# Patient Record
Sex: FEMALE | Race: WHITE | NOT HISPANIC OR LATINO | Employment: FULL TIME | URBAN - METROPOLITAN AREA
[De-identification: names, ages, dates, MRNs, and addresses within clinical notes are randomized per-mention and may not be internally consistent; named-entity substitution may affect disease eponyms.]

---

## 2017-05-31 ENCOUNTER — HOSPITAL ENCOUNTER (OUTPATIENT)
Dept: LAB | Facility: HOSPITAL | Age: 37
Discharge: HOME | End: 2017-05-31
Attending: FAMILY MEDICINE | Admitting: FAMILY MEDICINE

## 2017-05-31 LAB
ALBUMIN SERPL-MCNC: 4.3 G/DL (ref 3.5–4.8)
ALBUMIN/GLOB SERPL: 1.4 {RATIO} (ref 1–1.7)
ALP SERPL-CCNC: 49 IU/L (ref 32–91)
ALT SERPL-CCNC: 12 IU/L (ref 14–54)
ANION GAP SERPL CALC-SCNC: 13.5 MMOL/L (ref 10–20)
AST SERPL-CCNC: 14 IU/L (ref 15–41)
BILIRUB SERPL-MCNC: 0.6 MG/DL (ref 0.3–1.2)
BUN SERPL-MCNC: 11 MG/DL (ref 8–20)
BUN/CREAT SERPL: 15.7 (ref 5.4–26.2)
CALCIUM SERPL-MCNC: 9.1 MG/DL (ref 8.9–10.3)
CHLORIDE SERPL-SCNC: 101 MMOL/L (ref 101–111)
CHOLEST SERPL-MCNC: 205 MG/DL
CHOLEST/HDLC SERPL: 4.8 {RATIO}
CONV CO2: 27 MMOL/L (ref 22–32)
CONV LDL CHOLESTEROL DIRECT: 140 MG/DL (ref 0–100)
CONV TOTAL PROTEIN: 7.3 G/DL (ref 6.1–7.9)
CREAT UR-MCNC: 0.7 MG/DL (ref 0.4–1)
ERYTHROCYTE [DISTWIDTH] IN BLOOD BY AUTOMATED COUNT: 13.3 % (ref 11.5–14.5)
GLOBULIN UR ELPH-MCNC: 3 G/DL (ref 2.5–3.8)
GLUCOSE SERPL-MCNC: 100 MG/DL (ref 65–99)
HCT VFR BLD AUTO: 43.6 % (ref 35–49)
HDLC SERPL-MCNC: 43 MG/DL
HGB BLD-MCNC: 14.4 G/DL (ref 12–15)
LDLC/HDLC SERPL: 3.3 {RATIO}
LIPID INTERPRETATION: ABNORMAL
MCH RBC QN AUTO: 26.3 PG (ref 26–32)
MCHC RBC AUTO-ENTMCNC: 33 G/DL (ref 32–36)
MCV RBC AUTO: 79.7 FL (ref 80–94)
PLATELET # BLD AUTO: 256 10*3/UL (ref 150–450)
PMV BLD AUTO: 7.9 FL (ref 7.4–10.4)
POTASSIUM SERPL-SCNC: 3.5 MMOL/L (ref 3.6–5.1)
RBC # BLD AUTO: 5.47 10*6/UL (ref 4–5.4)
SODIUM SERPL-SCNC: 138 MMOL/L (ref 136–144)
TRIGL SERPL-MCNC: 123 MG/DL
TSH SERPL-ACNC: 0.78 UIU/ML (ref 0.34–5.6)
VLDLC SERPL CALC-MCNC: 22.6 MG/DL
WBC # BLD AUTO: 7.6 10*3/UL (ref 4.5–11.5)

## 2017-06-07 ENCOUNTER — HOSPITAL ENCOUNTER (OUTPATIENT)
Dept: FAMILY MEDICINE CLINIC | Facility: CLINIC | Age: 37
Setting detail: SPECIMEN
Discharge: HOME | End: 2017-06-07
Attending: FAMILY MEDICINE | Admitting: FAMILY MEDICINE

## 2017-06-07 LAB — POTASSIUM SERPL-SCNC: 3.8 MMOL/L (ref 3.6–5.1)

## 2017-08-15 ENCOUNTER — HOSPITAL ENCOUNTER (OUTPATIENT)
Dept: MAMMOGRAPHY | Facility: HOSPITAL | Age: 37
Discharge: HOME | End: 2017-08-15
Attending: OBSTETRICS & GYNECOLOGY | Admitting: OBSTETRICS & GYNECOLOGY

## 2019-09-14 ENCOUNTER — HOSPITAL ENCOUNTER (EMERGENCY)
Facility: HOSPITAL | Age: 39
Discharge: HOME | End: 2019-09-14
Attending: EMERGENCY MEDICINE | Admitting: EMERGENCY MEDICINE

## 2019-09-14 VITALS
SYSTOLIC BLOOD PRESSURE: 143 MMHG | HEART RATE: 73 BPM | TEMPERATURE: 98.3 F | HEIGHT: 65 IN | DIASTOLIC BLOOD PRESSURE: 99 MMHG | OXYGEN SATURATION: 99 % | BODY MASS INDEX: 27.6 KG/M2 | RESPIRATION RATE: 14 BRPM | WEIGHT: 165.65 LBS

## 2019-09-14 DIAGNOSIS — E87.6 HYPOKALEMIA: ICD-10-CM

## 2019-09-14 DIAGNOSIS — F41.9 ANXIETY: ICD-10-CM

## 2019-09-14 DIAGNOSIS — I10 HYPERTENSION, UNSPECIFIED TYPE: Primary | ICD-10-CM

## 2019-09-14 LAB
ANION GAP SERPL CALCULATED.3IONS-SCNC: 14.8 MMOL/L (ref 5–15)
BUN BLD-MCNC: 11 MG/DL (ref 8–20)
BUN/CREAT SERPL: 15.7 (ref 5.4–26.2)
CALCIUM SPEC-SCNC: 8.8 MG/DL (ref 8.9–10.3)
CHLORIDE SERPL-SCNC: 98 MMOL/L (ref 101–111)
CO2 SERPL-SCNC: 25 MMOL/L (ref 22–32)
CREAT BLD-MCNC: 0.7 MG/DL (ref 0.4–1)
GFR SERPL CREATININE-BSD FRML MDRD: 93 ML/MIN/1.73
GLUCOSE BLD-MCNC: 138 MG/DL (ref 65–99)
POTASSIUM BLD-SCNC: 2.8 MMOL/L (ref 3.6–5.1)
SODIUM BLD-SCNC: 135 MMOL/L (ref 136–144)

## 2019-09-14 PROCEDURE — 96374 THER/PROPH/DIAG INJ IV PUSH: CPT

## 2019-09-14 PROCEDURE — 80048 BASIC METABOLIC PNL TOTAL CA: CPT | Performed by: EMERGENCY MEDICINE

## 2019-09-14 PROCEDURE — 99283 EMERGENCY DEPT VISIT LOW MDM: CPT

## 2019-09-14 PROCEDURE — 93005 ELECTROCARDIOGRAM TRACING: CPT | Performed by: EMERGENCY MEDICINE

## 2019-09-14 RX ORDER — AMLODIPINE BESYLATE 5 MG/1
5 TABLET ORAL DAILY
Qty: 30 TABLET | Refills: 0 | Status: SHIPPED | OUTPATIENT
Start: 2019-09-14 | End: 2019-10-28

## 2019-09-14 RX ORDER — CALCIUM CARBONATE/VITAMIN D3 500-10/5ML
800 LIQUID (ML) ORAL DAILY
Qty: 60 EACH | Refills: 0 | Status: SHIPPED | OUTPATIENT
Start: 2019-09-14 | End: 2019-09-30 | Stop reason: SDUPTHER

## 2019-09-14 RX ORDER — POTASSIUM CHLORIDE 1500 MG/1
20 TABLET, FILM COATED, EXTENDED RELEASE ORAL DAILY
Qty: 7 TABLET | Refills: 0 | Status: SHIPPED | OUTPATIENT
Start: 2019-09-14 | End: 2019-09-30

## 2019-09-14 RX ORDER — SODIUM CHLORIDE 0.9 % (FLUSH) 0.9 %
10 SYRINGE (ML) INJECTION AS NEEDED
Status: DISCONTINUED | OUTPATIENT
Start: 2019-09-14 | End: 2019-09-14 | Stop reason: HOSPADM

## 2019-09-14 RX ORDER — LABETALOL HYDROCHLORIDE 5 MG/ML
10 INJECTION, SOLUTION INTRAVENOUS ONCE
Status: COMPLETED | OUTPATIENT
Start: 2019-09-14 | End: 2019-09-14

## 2019-09-14 RX ORDER — POTASSIUM CHLORIDE 20 MEQ/1
40 TABLET, EXTENDED RELEASE ORAL ONCE
Status: COMPLETED | OUTPATIENT
Start: 2019-09-14 | End: 2019-09-14

## 2019-09-14 RX ADMIN — POTASSIUM CHLORIDE 40 MEQ: 1500 TABLET, EXTENDED RELEASE ORAL at 16:57

## 2019-09-14 RX ADMIN — LABETALOL 20 MG/4 ML (5 MG/ML) INTRAVENOUS SYRINGE 10 MG: at 15:20

## 2019-09-30 ENCOUNTER — OFFICE VISIT (OUTPATIENT)
Dept: INTERNAL MEDICINE | Age: 39
End: 2019-09-30

## 2019-09-30 VITALS
OXYGEN SATURATION: 98 % | DIASTOLIC BLOOD PRESSURE: 98 MMHG | HEIGHT: 65 IN | HEART RATE: 102 BPM | BODY MASS INDEX: 27.66 KG/M2 | SYSTOLIC BLOOD PRESSURE: 148 MMHG | TEMPERATURE: 97.4 F | WEIGHT: 166 LBS

## 2019-09-30 DIAGNOSIS — F41.1 GENERALIZED ANXIETY DISORDER: ICD-10-CM

## 2019-09-30 DIAGNOSIS — E78.5 HYPERLIPIDEMIA, UNSPECIFIED HYPERLIPIDEMIA TYPE: ICD-10-CM

## 2019-09-30 DIAGNOSIS — Z09 HOSPITAL DISCHARGE FOLLOW-UP: ICD-10-CM

## 2019-09-30 DIAGNOSIS — I10 ESSENTIAL HYPERTENSION: Primary | ICD-10-CM

## 2019-09-30 DIAGNOSIS — E87.6 HYPOKALEMIA: ICD-10-CM

## 2019-09-30 PROBLEM — Z78.9 NEVER SMOKED ANY SUBSTANCE: Status: ACTIVE | Noted: 2017-05-25

## 2019-09-30 PROBLEM — F43.9 STRESS AT HOME: Status: ACTIVE | Noted: 2017-05-25

## 2019-09-30 PROBLEM — F32.A DEPRESSION: Status: ACTIVE | Noted: 2017-05-25

## 2019-09-30 PROCEDURE — 99203 OFFICE O/P NEW LOW 30 MIN: CPT | Performed by: NURSE PRACTITIONER

## 2019-09-30 RX ORDER — IBUPROFEN 200 MG
TABLET ORAL
COMMUNITY
Start: 2017-05-25

## 2019-09-30 RX ORDER — CHLORAL HYDRATE 500 MG
CAPSULE ORAL
COMMUNITY
Start: 2019-09-14

## 2019-09-30 RX ORDER — DIMENHYDRINATE 50 MG
TABLET ORAL
COMMUNITY
Start: 2019-09-20 | End: 2020-12-22

## 2019-09-30 RX ORDER — AVOBENZONE, HOMOSALATE, OCTISALATE, OCTOCRYLENE, OXYBENZONE 30; 130; 50; 70; 40 MG/ML; MG/ML; MG/ML; MG/ML; MG/ML
LOTION TOPICAL
COMMUNITY
Start: 2017-07-03

## 2019-09-30 RX ORDER — ASCORBIC ACID 1000 MG
TABLET ORAL
COMMUNITY
Start: 2017-05-25 | End: 2019-10-28 | Stop reason: SDUPTHER

## 2019-09-30 RX ORDER — VENLAFAXINE HYDROCHLORIDE 75 MG/1
CAPSULE, EXTENDED RELEASE ORAL
Refills: 1 | COMMUNITY
Start: 2019-09-13 | End: 2019-09-30

## 2019-09-30 RX ORDER — VITAMIN E 268 MG
CAPSULE ORAL
COMMUNITY
Start: 2019-09-14

## 2019-09-30 RX ORDER — PRENATAL WITH FERROUS FUM AND FOLIC ACID 3080; 920; 120; 400; 22; 1.84; 3; 20; 10; 1; 12; 200; 27; 25; 2 [IU]/1; [IU]/1; MG/1; [IU]/1; MG/1; MG/1; MG/1; MG/1; MG/1; MG/1; UG/1; MG/1; MG/1; MG/1; MG/1
TABLET ORAL
COMMUNITY
Start: 2017-05-25 | End: 2019-09-30

## 2019-09-30 RX ORDER — MAGNESIUM OXIDE 400 MG/1
2 TABLET ORAL DAILY
Refills: 0 | COMMUNITY
Start: 2019-09-14

## 2019-09-30 RX ORDER — MELATONIN 10 MG
CAPSULE ORAL
COMMUNITY
Start: 2017-05-25 | End: 2019-09-30 | Stop reason: SDUPTHER

## 2019-09-30 RX ORDER — HYDROCHLOROTHIAZIDE 12.5 MG/1
TABLET ORAL
COMMUNITY
Start: 2017-05-25 | End: 2019-10-28

## 2019-09-30 RX ORDER — NICOTINE POLACRILEX 2 MG
GUM BUCCAL
COMMUNITY
Start: 2019-09-14 | End: 2019-09-30 | Stop reason: SDUPTHER

## 2019-09-30 RX ORDER — LISINOPRIL 10 MG/1
10 TABLET ORAL DAILY
Qty: 30 TABLET | Refills: 3 | Status: SHIPPED | OUTPATIENT
Start: 2019-09-30 | End: 2020-01-15 | Stop reason: SDUPTHER

## 2019-09-30 RX ORDER — BIOTIN 10 MG
TABLET ORAL
COMMUNITY
Start: 2017-07-03 | End: 2019-10-28 | Stop reason: SDUPTHER

## 2019-09-30 NOTE — PROGRESS NOTES
"  Donna Simpson / 38 y.o. / female  Encounter Date: 09/30/2019    VITALS    Visit Vitals  /98   Pulse 102   Temp 97.4 °F (36.3 °C) (Temporal)   Ht 165.1 cm (65\")   Wt 75.3 kg (166 lb)   LMP 09/09/2019   SpO2 98%   BMI 27.62 kg/m²       BP Readings from Last 3 Encounters:   09/30/19 148/98   09/14/19 143/99     Wt Readings from Last 3 Encounters:   09/30/19 75.3 kg (166 lb)   09/14/19 75.1 kg (165 lb 10.5 oz)      Body mass index is 27.62 kg/m².    CC:  Main reason(s) for today's visit: Establish Care and Hypertension      HPI:   38-year-old female presents today for establishing care and hospital follow-up for hypertension and anxiety.  Below are the notes for hospital follow-up, and the course of treatment includes her follow-up on anxiety.    Date of emergency department encounter: 9/14/19  Emergency department: [facility]  Principle Dx: HTN  Secondary Dx: Anxiety, hypokalemia    History prior to ED visit: 38-year-old female presented with elevated blood pressure.  She had been seen in her gynecologist office today prior to the hospital, and was started on Effexor.  She did not like the way the Effexor made her feel, she reports that she felt \"like she was on drugs\", and she stopped taking it.  She states she has been under increased stress and anxiety for the past couple years since losing a child in 2015, and with personal complications with her ex-.  She is also single mother of a 5-year-old child.  She reports that her blood pressure has been elevated over the past 3 to 4 years, however has become increasingly so over the past few weeks, with her SBP >150 and DBP >90 consistently.  She took at home dose of hydrochlorothiazide, for the past few days, which was previously prescribed to her for elevated blood pressure few years ago, but she was not taking consistently.  She denied chest pain, shortness of breath, headache, visual disturbance, numbness, weakness, " paresthesias.    Evaluation/Treatment: She was given IV labetalol one-time dose in the hospital, with improvement in blood pressure numbers.  BMP was also ordered, and she was positive for hypokalemia.  She was discharged and placed on potassium and magnesium supplements for home.  She was started on Norvasc 5 mg daily and Zoloft 25 mg daily.  Discontinued Effexor.  He was advised to follow-up with her PCP, and given instructions for how to set up with PCP.      Course: Blood pressure remains markedly elevated today including diastolic greater than 90.  Heart rate is elevated, however she does express that she is somewhat nervous because she does not typically come to the doctor on a regular basis.  We reviewed her medications and her symptoms today in office.  She does report that she has had a few episodes of palpitations when she is at home, most likely related to anxiety.  Palpitations or arrhythmia was never captured when she was on the monitor at the hospital.  She does have elevated blood pressure readings when she takes her blood pressure at home or at her job.  Symptoms associated with elevated blood pressure include occasional headache, and palpitations.  She denies chest pain, visual disturbance, dizziness, lightheadedness, excessive fatigue, lower extremity swelling.  She is currently taking amlodipine 5 mg, and hydrochlorothiazide 12.5 mg once daily.  She does state that she has noticed improvement in her overall anxiety symptoms since starting Zoloft 25 mg approximately 1/2 weeks ago.  She denies any side effects of this medication.  She denies suicidal thoughts or homicidal thoughts, or self-harm.  She has finished her 7-day dose of potassium, but is continuing magnesium supplement at home.  BMP has not been rechecked.  She has not had labs for routine maintenance checked since approximately 2017.  She does have a history of elevated cholesterol as well, for which she does not take any  medication.      Anxiety MARIAH-7 9/30/2019   Feeling nervous, anxious or on edge 3   Not being able to stop or control worrying 3   Worrying too much about different things 3   Trouble Relaxing 3   Being so restless that it is hard to sit still 2   Becoming easily annoyed or irritable 2   Feeling afraid as if something awful might happen 2   MARIAH 7 Total Score 18   If you checked any problems, how difficult have these problems made it for you to do your work, take care of things at home, or get along with other people Somewhat difficult       PHQ-9 Depression Screening  Little interest or pleasure in doing things? 1   Feeling down, depressed, or hopeless? 1   Trouble falling or staying asleep, or sleeping too much? 1   Feeling tired or having little energy? 1   Poor appetite or overeating? 0   Feeling bad about yourself - or that you are a failure or have let yourself or your family down? 1   Trouble concentrating on things, such as reading the newspaper or watching television? 1   Moving or speaking so slowly that other people could have noticed? Or the opposite - being so fidgety or restless that you have been moving around a lot more than usual? 0   Thoughts that you would be better off dead, or of hurting yourself in some way? 0   PHQ-9 Total Score 6   If you checked off any problems, how difficult have these problems made it for you to do your work, take care of things at home, or get along with other people? Somewhat difficult         Patient Care Team:  Delia Rivero APRN as PCP - General (Nurse Practitioner)  ____________________________________________________________________    ASSESSMENT & PLAN:    1. Hospital discharge follow-up    2. Essential hypertension  -Advised patient to continue amlodipine 5 mg and HCTZ 12.5 mg once daily.  Continue with low-sodium diet, regular exercise and healthy heart diet.  Continue to work on stress management, and good sleep habits.  Check labs today.  Start lisinopril 10 mg  once daily, monitor for side effects or complications when starting medication.  Monitor blood pressure daily, and report readings at follow-up visit in 1 month.  - CBC & Differential  - lisinopril (PRINIVIL,ZESTRIL) 10 MG tablet; Take 1 tablet by mouth Daily.  Dispense: 30 tablet; Refill: 3    3. Hypokalemia  -Recheck CMP for hypokalemia, treated from hospital visit.  Advised patient to increase dietary sources of potassium.  - Comprehensive Metabolic Panel    4. Hyperlipidemia, unspecified hyperlipidemia type  -Recheck when fasting.  - Lipid Panel With / Chol / HDL Ratio    5. Generalized anxiety disorder  -Continue Zoloft 25 mg once daily.  Advised patient to monitor for increase or worsening mood symptoms, including SI/HI.  Recommended that patient reestablish with her previous therapist, whom she liked, and is not currently seeing.  She does have their number and intent to follow-up with them and schedule appointment, which I have reinforced the importance of.  We will follow-up on this at visit in 1 month.    Orders Placed This Encounter   Procedures   • Comprehensive Metabolic Panel   • Lipid Panel With / Chol / HDL Ratio   • CBC & Differential     New Medications Ordered This Visit   Medications   • lisinopril (PRINIVIL,ZESTRIL) 10 MG tablet     Sig: Take 1 tablet by mouth Daily.     Dispense:  30 tablet     Refill:  3       Summary/Discussion:  See orders and instructions above.     Return in about 4 weeks (around 10/28/2019) for Recheck HTN, anxiety.    Future Appointments   Date Time Provider Department Center   10/28/2019 11:00 AM Delia Rivero APRN [facility] None     ____________________________________________________________________    REVIEW OF SYSTEMS    As noted per HPI  Constitutional neg  Resp neg  CV neg       PHYSICAL EXAMINATION    Constitutional  No distress  Cardiovascular Rate  normal . Rhythm: regular . Heart sounds:  normal  Pulmonary/Chest  Effort normal. Breath sounds:   normal  Psychiatric  Alert. Judgment and thought content normal. Mood normal    REVIEWED DATA:    Labs:   No visits with results within 14 Day(s) from this visit.   Latest known visit with results is:   Admission on 09/14/2019, Discharged on 09/14/2019   Component Date Value Ref Range Status   • Glucose 09/14/2019 138* 65 - 99 mg/dL Final   • BUN 09/14/2019 11  8 - 20 mg/dL Final   • Creatinine 09/14/2019 0.70  0.40 - 1.00 mg/dL Final   • Sodium 09/14/2019 135* 136 - 144 mmol/L Final   • Potassium 09/14/2019 2.8* 3.6 - 5.1 mmol/L Final   • Chloride 09/14/2019 98* 101 - 111 mmol/L Final   • CO2 09/14/2019 25.0  22.0 - 32.0 mmol/L Final   • Calcium 09/14/2019 8.8* 8.9 - 10.3 mg/dL Final   • eGFR Non African Amer 09/14/2019 93  >60 mL/min/1.73 Final   • BUN/Creatinine Ratio 09/14/2019 15.7  5.4 - 26.2 Final   • Anion Gap 09/14/2019 14.8  5.0 - 15.0 mmol/L Final         Imaging:   No results found.       Medical Tests:         Summary of old records / correspondence / consultant report:   DC summary re: issues addressed on HPI    Request outside records:       ALLERGIES  Allergies   Allergen Reactions   • Penicillins Unknown (See Comments)     Unknown        MEDICATIONS  Current Outpatient Medications on File Prior to Visit   Medication Sig   • amLODIPine (NORVASC) 5 MG tablet Take 1 tablet by mouth Daily.   • B Complex Vitamins (VITAMIN B COMPLEX PO)    • B Complex-Biotin-FA (B COMPLEX 100 TR) tablet controlled-release B COMPLEX 100 TR CR-TABS   • Biotin 10 MG tablet BIOTIN 10 MG TABS   • Coenzyme Q10 (CO Q 10) 10 MG capsule CO Q 10 10 MG CAPS   • Collagen-Vitamin C (COLLAGEN PLUS VITAMIN C PO)    • Flaxseed, Linseed, (FLAX SEED OIL) 1000 MG capsule    • hepatitis A (HAVRIX) 1440 EL U/ML vaccine Inject  into the appropriate muscle as directed by prescriber.   • hydroCHLOROthiazide (HYDRODIURIL) 12.5 MG tablet    • ibuprofen (EQ IBUPROFEN) 200 MG tablet EQ IBUPROFEN 200 MG TABS   • magnesium oxide (MAG-OX) 400 MG tablet  Take 2 tablets by mouth Daily.   • Melatonin 5 MG capsule    • Omega-3 Fatty Acids (FISH OIL) 1000 MG capsule capsule    • sertraline (ZOLOFT) 50 MG tablet Take 0.5 tablets by mouth Daily.   • vitamin E (vitamin E) 400 UNIT capsule    • [DISCONTINUED] Biotin 1 MG capsule    • [DISCONTINUED] Magnesium Oxide 400 MG capsule Take 800 mg by mouth Daily.   • [DISCONTINUED] Melatonin ([employer] MELATONIN) 10 MG capsule CVS MELATONIN 10 MG CAPS   • [DISCONTINUED] Prenatal Vit-Fe Fumarate-FA (PRENATAL 27-1) 27-1 MG tablet tablet PRENATAL 27-1 MG TABS   • [DISCONTINUED] venlafaxine XR (EFFEXOR-XR) 75 MG 24 hr capsule TK ONE C PO D   • [DISCONTINUED] potassium chloride ER (K-TAB) 20 MEQ tablet controlled-release ER tablet Take 1 tablet by mouth Daily.     No current facility-administered medications on file prior to visit.        PFSH:     The following portions of the patient's history were reviewed and updated as appropriate: Allergies / Current Medications / Past Medical History / Surgical History / Social History / Family History    PROBLEM LIST   Patient Active Problem List   Diagnosis   • Depression   • Hypertension   • Hypokalemia   • Never smoked any substance   • Stress at home       PAST MEDICAL HISTORY  Past Medical History:   Diagnosis Date   • Hypertension        SURGICAL HISTORY  No past surgical history on file.    SOCIAL HISTORY  Social History     Socioeconomic History   • Marital status: Single     Spouse name: Not on file   • Number of children: Not on file   • Years of education: Not on file   • Highest education level: Not on file   Tobacco Use   • Smoking status: Never Smoker   • Smokeless tobacco: Never Used   Substance and Sexual Activity   • Alcohol use: No     Frequency: Never   • Drug use: No       FAMILY HISTORY  No family history on file.

## 2019-09-30 NOTE — PATIENT INSTRUCTIONS

## 2019-10-01 ENCOUNTER — APPOINTMENT (OUTPATIENT)
Dept: LAB | Facility: HOSPITAL | Age: 39
End: 2019-10-01

## 2019-10-01 LAB
ALBUMIN SERPL-MCNC: 4.4 G/DL (ref 3.5–5.2)
ALBUMIN/GLOB SERPL: 1.5 G/DL
ALP SERPL-CCNC: 66 U/L (ref 39–117)
ALT SERPL W P-5'-P-CCNC: 22 U/L (ref 1–33)
ANION GAP SERPL CALCULATED.3IONS-SCNC: 11.4 MMOL/L (ref 5–15)
AST SERPL-CCNC: 17 U/L (ref 1–32)
BASOPHILS # BLD AUTO: 0.03 10*3/MM3 (ref 0–0.2)
BASOPHILS NFR BLD AUTO: 0.3 % (ref 0–1.5)
BILIRUB SERPL-MCNC: 0.2 MG/DL (ref 0.2–1.2)
BUN BLD-MCNC: 14 MG/DL (ref 6–20)
BUN/CREAT SERPL: 21.2 (ref 7–25)
CALCIUM SPEC-SCNC: 8.5 MG/DL (ref 8.6–10.5)
CHLORIDE SERPL-SCNC: 99 MMOL/L (ref 98–107)
CHOLEST SERPL-MCNC: 185 MG/DL (ref 0–200)
CO2 SERPL-SCNC: 28.6 MMOL/L (ref 22–29)
CREAT BLD-MCNC: 0.66 MG/DL (ref 0.57–1)
DEPRECATED RDW RBC AUTO: 36.6 FL (ref 37–54)
EOSINOPHIL # BLD AUTO: 0.19 10*3/MM3 (ref 0–0.4)
EOSINOPHIL NFR BLD AUTO: 1.9 % (ref 0.3–6.2)
ERYTHROCYTE [DISTWIDTH] IN BLOOD BY AUTOMATED COUNT: 13.3 % (ref 12.3–15.4)
GFR SERPL CREATININE-BSD FRML MDRD: 99 ML/MIN/1.73
GLOBULIN UR ELPH-MCNC: 2.9 GM/DL
GLUCOSE BLD-MCNC: 104 MG/DL (ref 65–99)
HCT VFR BLD AUTO: 39.1 % (ref 34–46.6)
HDLC SERPL QL: 4.02
HDLC SERPL-MCNC: 46 MG/DL (ref 40–60)
HGB BLD-MCNC: 12.2 G/DL (ref 12–15.9)
IMM GRANULOCYTES # BLD AUTO: 0.02 10*3/MM3 (ref 0–0.05)
IMM GRANULOCYTES NFR BLD AUTO: 0.2 % (ref 0–0.5)
LDLC SERPL CALC-MCNC: 124 MG/DL (ref 0–100)
LYMPHOCYTES # BLD AUTO: 2.72 10*3/MM3 (ref 0.7–3.1)
LYMPHOCYTES NFR BLD AUTO: 27.9 % (ref 19.6–45.3)
MCH RBC QN AUTO: 23.9 PG (ref 26.6–33)
MCHC RBC AUTO-ENTMCNC: 31.2 G/DL (ref 31.5–35.7)
MCV RBC AUTO: 76.7 FL (ref 79–97)
MONOCYTES # BLD AUTO: 0.58 10*3/MM3 (ref 0.1–0.9)
MONOCYTES NFR BLD AUTO: 5.9 % (ref 5–12)
NEUTROPHILS # BLD AUTO: 6.22 10*3/MM3 (ref 1.7–7)
NEUTROPHILS NFR BLD AUTO: 63.8 % (ref 42.7–76)
NRBC BLD AUTO-RTO: 0 /100 WBC (ref 0–0.2)
PLATELET # BLD AUTO: 289 10*3/MM3 (ref 140–450)
PMV BLD AUTO: 9.3 FL (ref 6–12)
POTASSIUM BLD-SCNC: 3.5 MMOL/L (ref 3.5–5.2)
PROT SERPL-MCNC: 7.3 G/DL (ref 6–8.5)
RBC # BLD AUTO: 5.1 10*6/MM3 (ref 3.77–5.28)
SODIUM BLD-SCNC: 139 MMOL/L (ref 136–145)
TRIGL SERPL-MCNC: 73 MG/DL (ref 0–150)
VLDLC SERPL-MCNC: 14.6 MG/DL (ref 5–40)
WBC NRBC COR # BLD: 9.76 10*3/MM3 (ref 3.4–10.8)

## 2019-10-01 PROCEDURE — 80061 LIPID PANEL: CPT | Performed by: NURSE PRACTITIONER

## 2019-10-01 PROCEDURE — 85025 COMPLETE CBC W/AUTO DIFF WBC: CPT | Performed by: NURSE PRACTITIONER

## 2019-10-01 PROCEDURE — 36415 COLL VENOUS BLD VENIPUNCTURE: CPT | Performed by: NURSE PRACTITIONER

## 2019-10-01 PROCEDURE — 80053 COMPREHEN METABOLIC PANEL: CPT | Performed by: NURSE PRACTITIONER

## 2019-10-15 ENCOUNTER — TELEPHONE (OUTPATIENT)
Dept: INTERNAL MEDICINE | Age: 39
End: 2019-10-15

## 2019-10-15 DIAGNOSIS — F41.1 GENERALIZED ANXIETY DISORDER: ICD-10-CM

## 2019-10-15 DIAGNOSIS — F43.9 STRESS AT HOME: Primary | ICD-10-CM

## 2019-10-15 RX ORDER — SERTRALINE HYDROCHLORIDE 25 MG/1
25 TABLET, FILM COATED ORAL DAILY
Qty: 30 TABLET | Refills: 5 | Status: SHIPPED | OUTPATIENT
Start: 2019-10-15 | End: 2019-10-16 | Stop reason: SDUPTHER

## 2019-10-15 NOTE — TELEPHONE ENCOUNTER
Regarding: Prescription Question  Contact: [phone]  ----- Message from Jay, Generic sent at 10/15/2019 12:56 PM EDT -----    Kevin Lin!    I have a few Sertraline left and wanted to know if you could refill my Rx? It is for Sertraline 50 mg. My preferred Pharmacy is [facility] in [place], [place]   [address] [place] [address]  Please let me know if you have any questions! :)    Thank you!!

## 2019-10-16 DIAGNOSIS — F43.9 STRESS AT HOME: ICD-10-CM

## 2019-10-16 DIAGNOSIS — F41.1 GENERALIZED ANXIETY DISORDER: ICD-10-CM

## 2019-10-16 RX ORDER — SERTRALINE HYDROCHLORIDE 25 MG/1
25 TABLET, FILM COATED ORAL DAILY
Qty: 30 TABLET | Refills: 5 | Status: SHIPPED | OUTPATIENT
Start: 2019-10-16 | End: 2019-10-28

## 2019-10-25 NOTE — PROGRESS NOTES
[facility]  TIFFANIE Hines      Donna VEGA Simpson / 39 y.o. / female  10/28/2019      ASSESSMENT & PLAN:    Problem List Items Addressed This Visit        Cardiovascular and Mediastinum    Hypertension - Primary    Current Assessment & Plan     Significantly improved. Continue lisinopril 10 mg once daily, monitor BP values and send readings if SBP<100 or >135. Follow up 4 months.          Relevant Medications    lisinopril (PRINIVIL,ZESTRIL) 10 MG tablet       Other    Depression    Relevant Medications    sertraline (ZOLOFT) 25 MG tablet    Stress at home    Relevant Medications    sertraline (ZOLOFT) 25 MG tablet    Generalized anxiety disorder    Current Assessment & Plan     Stable on 50 mg sertraline daily. Planning to start therapy/counseling again with previous provider or through [facility]. Will follow up in 4 months.          Relevant Medications    sertraline (ZOLOFT) 25 MG tablet        No orders of the defined types were placed in this encounter.    New Medications Ordered This Visit   Medications   • sertraline (ZOLOFT) 25 MG tablet     Sig: Take 2 tablets by mouth Daily.     Dispense:  30 tablet     Refill:  5       Summary/Discussion:      Return in about 4 months (around 2/28/2020) for Annual physical with fasting labs 1 week prior.  ____________________________________________________________________    MEDICATIONS  Current Outpatient Medications   Medication Sig Dispense Refill   • B Complex Vitamins (VITAMIN B COMPLEX PO)      • B Complex-Biotin-FA (B COMPLEX 100 TR) tablet controlled-release B COMPLEX 100 TR CR-TABS     • Collagen-Vitamin C (COLLAGEN PLUS VITAMIN C PO)      • Flaxseed, Linseed, (FLAX SEED OIL) 1000 MG capsule      • ibuprofen (EQ IBUPROFEN) 200 MG tablet EQ IBUPROFEN 200 MG TABS     • lisinopril (PRINIVIL,ZESTRIL) 10 MG tablet Take 1 tablet by mouth Daily. 30 tablet 3   • magnesium oxide (MAG-OX) 400 MG tablet Take 2 tablets by mouth Daily.  0   • Melatonin 5 MG  "capsule      • Omega-3 Fatty Acids (FISH OIL) 1000 MG capsule capsule      • sertraline (ZOLOFT) 25 MG tablet Take 2 tablets by mouth Daily. 30 tablet 5   • vitamin E (vitamin E) 400 UNIT capsule        No current facility-administered medications for this visit.        VITALS    Visit Vitals  /82   Pulse 71   Temp 98 °F (36.7 °C) (Temporal)   Ht 165.1 cm (65\")   Wt 74.8 kg (165 lb)   LMP 10/01/2019   SpO2 99%   BMI 27.46 kg/m²       BP Readings from Last 3 Encounters:   10/28/19 138/82   09/30/19 148/98   09/14/19 143/99     Wt Readings from Last 3 Encounters:   10/28/19 74.8 kg (165 lb)   09/30/19 75.3 kg (166 lb)   09/14/19 75.1 kg (165 lb 10.5 oz)      Body mass index is 27.46 kg/m².    CC:  Main reason(s) for today's visit: Follow-up for hypertension and depression/anxiety    HPI:   Chronic essential hypertension:  Since prior visit: compliant with medication(s), checks blood pressure regularly, denies significant problems with medication(s), SBP < 120 and SBP < 110. Currently taking lisinopril (Prinivil). She is occasionally exercising, and is adherent to low sodium diet. BP readings at home are SBP<120, stable. She denies symptoms of chest pain/pressure, dyspnea, swelling, vision impairment.   CVD risk factors include:  HTN, minimally sedentary lifestyle. Use of agents associated with HTN: no tobacco use, alcohol intake:occasional and caffeine intake: 1-2 cups of caffeinated coffee per day . Most recent in-office blood pressure readings:   BP Readings from Last 3 Encounters:   10/28/19 138/82   09/30/19 148/98   09/14/19 143/99       Depression/anxiety: Stable today.  Patient has increased sertraline to 50 mg once daily.  Tolerating well.  She plans to contact her previous therapist, in order to reestablish, however if therapist is out of network she will explore new options.  She is to [facility] and is aware of [facility] services.  I advised her to let me know if she is needing my " assistance with establishing with a therapist or counselor.  We discussed the importance of therapy in combination with medication for best results with anxiety and depression.  She verbalized understanding and agreement.  She feels overall improved mood and ability to control intensity of mood swings.    Patient Care Team:  Delia Rivero APRN as PCP - General (Nurse Practitioner)  ____________________________________________________________________      REVIEW OF SYSTEMS    Review of Systems  As noted per HPI  Constitutional neg  Resp neg  CV neg       PHYSICAL EXAMINATION    Physical Exam  Constitutional  No distress  Cardiovascular Rate  normal . Rhythm: regular . Heart sounds:  Normal  Pulmonary/Chest: Effort normal and breath sounds normal.    Psychiatric  Alert. Judgment and thought content normal. Mood normal    REVIEWED DATA:    Labs:   Lab Results   Component Value Date     10/01/2019    K 3.5 10/01/2019    AST 17 10/01/2019    ALT 22 10/01/2019    BUN 14 10/01/2019    CREATININE 0.66 10/01/2019    CREATININE 0.70 09/14/2019    CREATININE 0.7 05/31/2017    EGFRIFNONA 99 10/01/2019       Lab Results   Component Value Date    GLUCOSE 104 (H) 10/01/2019    GLUCOSE 138 (H) 09/14/2019    GLUCOSE 100 (H) 05/31/2017       Lab Results   Component Value Date     (H) 10/01/2019     (H) 05/31/2017    HDL 46 10/01/2019    HDL 43 05/31/2017    TRIG 73 10/01/2019    TRIG 123 05/31/2017    CHOLHDLRATIO 4.02 10/01/2019    CHOLHDLRATIO 4.8 05/31/2017       Lab Results   Component Value Date    TSH 0.78 05/31/2017          Lab Results   Component Value Date    WBC 9.76 10/01/2019    HGB 12.2 10/01/2019    HGB 14.4 05/31/2017     10/01/2019       No results found for: PROTEIN, GLUCOSEU, BLOODU, NITRITEU, LEUKOCYTESUR    Imaging:        Medical Tests:        Summary of old records / correspondence / consultant report:        Request outside records:        ALLERGIES  Allergies   Allergen Reactions    • Penicillins Unknown (See Comments)     Unknown        PFSH:     The following portions of the patient's history were reviewed and updated as appropriate: Allergies / Current Medications / Past Medical History / Surgical History / Social History / Family History    PROBLEM LIST   Patient Active Problem List   Diagnosis   • Depression   • Hypertension   • Hypokalemia   • Never smoked any substance   • Stress at home   • Generalized anxiety disorder       PAST MEDICAL HISTORY  Past Medical History:   Diagnosis Date   • Hypertension        SURGICAL HISTORY  No past surgical history on file.    SOCIAL HISTORY  Social History     Socioeconomic History   • Marital status: Single     Spouse name: Not on file   • Number of children: Not on file   • Years of education: Not on file   • Highest education level: Not on file   Tobacco Use   • Smoking status: Never Smoker   • Smokeless tobacco: Never Used   Substance and Sexual Activity   • Alcohol use: No     Frequency: Never   • Drug use: No       FAMILY HISTORY  No family history on file.

## 2019-10-28 ENCOUNTER — OFFICE VISIT (OUTPATIENT)
Dept: INTERNAL MEDICINE | Age: 39
End: 2019-10-28

## 2019-10-28 VITALS
OXYGEN SATURATION: 99 % | HEART RATE: 71 BPM | DIASTOLIC BLOOD PRESSURE: 82 MMHG | BODY MASS INDEX: 27.49 KG/M2 | TEMPERATURE: 98 F | WEIGHT: 165 LBS | HEIGHT: 65 IN | SYSTOLIC BLOOD PRESSURE: 138 MMHG

## 2019-10-28 DIAGNOSIS — I10 ESSENTIAL HYPERTENSION: Primary | ICD-10-CM

## 2019-10-28 DIAGNOSIS — F41.1 GENERALIZED ANXIETY DISORDER: ICD-10-CM

## 2019-10-28 DIAGNOSIS — F43.9 STRESS AT HOME: ICD-10-CM

## 2019-10-28 DIAGNOSIS — F32.A DEPRESSION, UNSPECIFIED DEPRESSION TYPE: ICD-10-CM

## 2019-10-28 PROCEDURE — 99214 OFFICE O/P EST MOD 30 MIN: CPT | Performed by: NURSE PRACTITIONER

## 2019-10-28 RX ORDER — SERTRALINE HYDROCHLORIDE 25 MG/1
50 TABLET, FILM COATED ORAL DAILY
Qty: 30 TABLET | Refills: 5
Start: 2019-10-28 | End: 2019-10-30 | Stop reason: SDUPTHER

## 2019-10-28 NOTE — ASSESSMENT & PLAN NOTE
Significantly improved. Continue lisinopril 10 mg once daily, monitor BP values and send readings if SBP<100 or >135. Follow up 4 months.

## 2019-10-28 NOTE — ASSESSMENT & PLAN NOTE
Stable on 50 mg sertraline daily. Planning to start therapy/counseling again with previous provider or through [facility]. Will follow up in 4 months.

## 2019-10-30 DIAGNOSIS — F43.9 STRESS AT HOME: ICD-10-CM

## 2019-10-30 DIAGNOSIS — F41.1 GENERALIZED ANXIETY DISORDER: ICD-10-CM

## 2020-01-07 ENCOUNTER — OFFICE VISIT (OUTPATIENT)
Dept: INTERNAL MEDICINE | Age: 40
End: 2020-01-07

## 2020-01-07 VITALS
DIASTOLIC BLOOD PRESSURE: 62 MMHG | SYSTOLIC BLOOD PRESSURE: 104 MMHG | OXYGEN SATURATION: 98 % | HEIGHT: 65 IN | HEART RATE: 103 BPM | BODY MASS INDEX: 26.49 KG/M2 | WEIGHT: 159 LBS | TEMPERATURE: 96.7 F

## 2020-01-07 DIAGNOSIS — N76.6 GENITAL ULCER, FEMALE: Primary | ICD-10-CM

## 2020-01-07 DIAGNOSIS — J02.9 PHARYNGITIS, UNSPECIFIED ETIOLOGY: ICD-10-CM

## 2020-01-07 DIAGNOSIS — K12.0 APHTHOUS ULCER OF MOUTH: ICD-10-CM

## 2020-01-07 PROCEDURE — 99214 OFFICE O/P EST MOD 30 MIN: CPT | Performed by: NURSE PRACTITIONER

## 2020-01-07 RX ORDER — VALACYCLOVIR HYDROCHLORIDE 1 G/1
1000 TABLET, FILM COATED ORAL 2 TIMES DAILY
Qty: 20 TABLET | Refills: 0 | Status: SHIPPED | OUTPATIENT
Start: 2020-01-07 | End: 2020-01-17

## 2020-01-07 NOTE — PROGRESS NOTES
Donna Simpson / 39 y.o. / female  Encounter Date: 01/07/2020    ASSESSMENT & PLAN:    Problem List Items Addressed This Visit     None      Visit Diagnoses     Genital ulcer, female    -  Primary    Relevant Medications    valACYclovir (VALTREX) 1000 MG tablet    lidocaine (XYLOCAINE) 2 % jelly    Other Relevant Orders    Chlamydia trachomatis, Neisseria gonorrhoeae, Trichomonas vaginalis, PCR - Urine, Urine, Clean Catch    HIV-1 / O / 2 Ag / Antibody 4th Generation    RPR    HSV 1 & 2 - Specific Antibody, IgG    HSV 1 & 2 IgM, Antibodies, Indirect    Aphthous ulcer of mouth        Relevant Orders    Chlamydia trachomatis, Neisseria gonorrhoeae, Trichomonas vaginalis, PCR - Urine, Urine, Clean Catch    HIV-1 / O / 2 Ag / Antibody 4th Generation    RPR    HSV 1 & 2 - Specific Antibody, IgG    HSV 1 & 2 IgM, Antibodies, Indirect    Pharyngitis, unspecified etiology        Relevant Orders    Chlamydia trachomatis, Neisseria gonorrhoeae, Trichomonas vaginalis, PCR - Urine, Urine, Clean Catch    HIV-1 / O / 2 Ag / Antibody 4th Generation    RPR    HSV 1 & 2 - Specific Antibody, IgG    HSV 1 & 2 IgM, Antibodies, Indirect        Orders Placed This Encounter   Procedures   • Chlamydia trachomatis, Neisseria gonorrhoeae, Trichomonas vaginalis, PCR - Urine, Urine, Clean Catch   • HIV-1 / O / 2 Ag / Antibody 4th Generation   • RPR   • HSV 1 & 2 - Specific Antibody, IgG   • HSV 1 & 2 IgM, Antibodies, Indirect     New Medications Ordered This Visit   Medications   • valACYclovir (VALTREX) 1000 MG tablet     Sig: Take 1 tablet by mouth 2 (Two) Times a Day for 10 days.     Dispense:  20 tablet     Refill:  0   • lidocaine (XYLOCAINE) 2 % jelly     Sig: Apply  topically to the appropriate area as directed As Needed for Mild Pain  or Moderate Pain .     Dispense:  85 g     Refill:  1       Summary/Discussion:  1. Genital ulcer, female  -Full STI panel including urine, swab, blood tests for chlamydia, gonorrhea, trichomonas, HIV,  "syphilis, HSV.  We will preemptively treat with valacyclovir as prescribed, and prescription for lidocaine jelly to apply topically to the area for pain relief.  Advised patient to continue with cool compresses as needed, avoid irritating or tight clothing, use Magic mouthwash and oral rinses for ulcers in mouth and pharyngitis.  Monitor for new or worsening symptoms, and return to clinic as needed.    - Chlamydia trachomatis, Neisseria gonorrhoeae, Trichomonas vaginalis, PCR - Urine, Urine, Clean Catch  - HIV-1 / O / 2 Ag / Antibody 4th Generation  - RPR  - HSV 1 & 2 - Specific Antibody, IgG  - HSV 1 & 2 IgM, Antibodies, Indirect  - valACYclovir (VALTREX) 1000 MG tablet; Take 1 tablet by mouth 2 (Two) Times a Day for 10 days.  Dispense: 20 tablet; Refill: 0  - lidocaine (XYLOCAINE) 2 % jelly; Apply  topically to the appropriate area as directed As Needed for Mild Pain  or Moderate Pain .  Dispense: 85 g; Refill: 1    2. Aphthous ulcer of mouth  - Chlamydia trachomatis, Neisseria gonorrhoeae, Trichomonas vaginalis, PCR - Urine, Urine, Clean Catch  - HIV-1 / O / 2 Ag / Antibody 4th Generation  - RPR  - HSV 1 & 2 - Specific Antibody, IgG  - HSV 1 & 2 IgM, Antibodies, Indirect    3. Pharyngitis, unspecified etiology  - Chlamydia trachomatis, Neisseria gonorrhoeae, Trichomonas vaginalis, PCR - Urine, Urine, Clean Catch  - HIV-1 / O / 2 Ag / Antibody 4th Generation  - RPR  - HSV 1 & 2 - Specific Antibody, IgG  - HSV 1 & 2 IgM, Antibodies, Indirect        Return if symptoms worsen or fail to improve, for Next scheduled follow up.  ________________________________________________________________    VITALS:    Visit Vitals  /62   Pulse 103   Temp 96.7 °F (35.9 °C) (Temporal)   Ht 165.1 cm (65\")   Wt 72.1 kg (159 lb)   SpO2 98%   BMI 26.46 kg/m²       BP Readings from Last 3 Encounters:   01/07/20 104/62   10/28/19 138/82   09/30/19 148/98     Wt Readings from Last 3 Encounters:   01/07/20 72.1 kg (159 lb)   10/28/19 " 74.8 kg (165 lb)   09/30/19 75.3 kg (166 lb)      Body mass index is 26.46 kg/m².    CC: Main reason(s) for today's visit: Exposure to STD      HPI    Patient is a 39 y.o. female who is here for: vaginal ulcers, pharyngitis and 2-3 oral aphthous ulcers x 7 days. Please refer to [employer] message from patient dated 1/7/2020 for further details as summarized here: She first noticed the rash 2 days after new sexual contact on 1/1/2020, following 4 years of abstinence. Rash is described as: Initially sore and tender in the vaginal region, with development of sores/blisters yesterday and yellow, purulent discharge.  The sores are extremely painful, and weeping, she is having to wear a panty liner for drainage.  She has had a low-grade fever for approximately 4 days.  She also complains of pharyngitis, and ulcers in her mouth that have also developed within the past few days, as well as overall fatigue.  Genital ulcers are diffuse, scattered along MECHELLE labia both external and internally. Aggravated by: touch/underwear/clothing. Relieved by: cool wash. She has been applying triple antibiotic ointment and A+D ointment without relief.  Associated symptoms include: low grade fever, pharyngitis. She is afebrile on exam today. She does have 2 moderate size aphthous ulcers inside the lower L lip without bleeding.   She denies: oral intercourse or knowledge of partner's STI status.       Patient Care Team:  Delia Rivero APRN as PCP - General (Nurse Practitioner)  ____________________________________________________________________    REVIEW OF SYSTEMS    Review of Systems  As noted per HPI  Constitutional neg  Resp neg  CV neg     PHYSICAL EXAMINATION    Physical Exam   Constitutional: She is oriented to person, place, and time. She appears well-developed and well-nourished. She appears distressed.   HENT:   Mouth/Throat: Uvula is midline and mucous membranes are normal. Oral lesions (aphthous ulcers inside lower lip MECHELLE x 3 )  present. No uvula swelling or lacerations. Posterior oropharyngeal edema (MECHELLE tonsils 2+) and posterior oropharyngeal erythema present. No oropharyngeal exudate or tonsillar abscesses.   Neck: Normal range of motion. Neck supple.   Cardiovascular: Regular rhythm and normal heart sounds. Tachycardia present.   Mild tachycardia 103     Genitourinary:       There is tenderness and lesion (scattered lesions throughout labial folds, extended to anus. closed top ulcerations, mild erythema and edema. ) on the right labia.   Genitourinary Comments: Lesion as noted. Thick green/yellow discharge noted throughout labial folds.    Musculoskeletal: Normal range of motion.   Lymphadenopathy:     She has no cervical adenopathy.   Neurological: She is alert and oriented to person, place, and time.   Psychiatric: She has a normal mood and affect.   Vitals reviewed.        REVIEWED DATA:    Labs:   Lab Results   Component Value Date     10/01/2019    K 3.5 10/01/2019    AST 17 10/01/2019    ALT 22 10/01/2019    BUN 14 10/01/2019    CREATININE 0.66 10/01/2019    CREATININE 0.70 09/14/2019    CREATININE 0.7 05/31/2017    EGFRIFNONA 99 10/01/2019       Lab Results   Component Value Date    GLUCOSE 104 (H) 10/01/2019    GLUCOSE 138 (H) 09/14/2019    GLUCOSE 100 (H) 05/31/2017       Lab Results   Component Value Date     (H) 10/01/2019     (H) 05/31/2017    HDL 46 10/01/2019    TRIG 73 10/01/2019    CHOLHDLRATIO 4.02 10/01/2019       Lab Results   Component Value Date    TSH 0.78 05/31/2017          Lab Results   Component Value Date    WBC 9.76 10/01/2019    HGB 12.2 10/01/2019    HGB 14.4 05/31/2017     10/01/2019         Imaging:      Medical Tests:      Summary of old records / correspondence / consultant report:      Request outside records:   ______________________________________________________________________    ALLERGIES  Allergies   Allergen Reactions   • Penicillins Unknown (See Comments)     Unknown         MEDICATIONS  Current Outpatient Medications on File Prior to Visit   Medication Sig   • B Complex Vitamins (VITAMIN B COMPLEX PO)    • B Complex-Biotin-FA (B COMPLEX 100 TR) tablet controlled-release B COMPLEX 100 TR CR-TABS   • Collagen-Vitamin C (COLLAGEN PLUS VITAMIN C PO)    • Flaxseed, Linseed, (FLAX SEED OIL) 1000 MG capsule    • ibuprofen (EQ IBUPROFEN) 200 MG tablet EQ IBUPROFEN 200 MG TABS   • lisinopril (PRINIVIL,ZESTRIL) 10 MG tablet Take 1 tablet by mouth Daily.   • magnesium oxide (MAG-OX) 400 MG tablet Take 2 tablets by mouth Daily.   • Melatonin 5 MG capsule    • Omega-3 Fatty Acids (FISH OIL) 1000 MG capsule capsule    • sertraline (ZOLOFT) 50 MG tablet Take 1 tablet by mouth Daily.   • vitamin E (vitamin E) 400 UNIT capsule      No current facility-administered medications on file prior to visit.        PFSH:     The following portions of the patient's history were reviewed and updated as appropriate: Allergies / Current Medications / Past Medical History / Surgical History / Social History / Family History    PROBLEM LIST   Patient Active Problem List   Diagnosis   • Depression   • Hypertension   • Hypokalemia   • Never smoked any substance   • Stress at home   • Generalized anxiety disorder       PAST MEDICAL HISTORY  Past Medical History:   Diagnosis Date   • Hypertension        SURGICAL HISTORY  No past surgical history on file.    SOCIAL HISTORY  Social History     Socioeconomic History   • Marital status: Single     Spouse name: Not on file   • Number of children: Not on file   • Years of education: Not on file   • Highest education level: Not on file   Tobacco Use   • Smoking status: Never Smoker   • Smokeless tobacco: Never Used   Substance and Sexual Activity   • Alcohol use: No     Frequency: Never   • Drug use: No       FAMILY HISTORY  No family history on file.

## 2020-01-09 DIAGNOSIS — F41.1 GENERALIZED ANXIETY DISORDER: ICD-10-CM

## 2020-01-09 DIAGNOSIS — F43.9 STRESS AT HOME: ICD-10-CM

## 2020-01-10 ENCOUNTER — TELEPHONE (OUTPATIENT)
Dept: INTERNAL MEDICINE | Age: 40
End: 2020-01-10

## 2020-01-10 NOTE — TELEPHONE ENCOUNTER
----- Message from Donna Simpson sent at 1/9/2020  4:28 PM EST -----  Regarding: RE: Prescription Question  Contact: [phone]  Thank you! While I am very sad to see you go, I am excited for you and your next chapter! I will be on the lookout for ya! You've been absolutely awesome, Delia! :)    Donna Martin  ----- Message -----  From: TIFFANIE Hines  Sent: 1/9/2020  4:13 PM EST  To: Donna Simpson  Subject: RE: Prescription Question  Unfortunately, I'm not. My  was relocated for his job, and we will be gone for a year or two.   Keep an eye out for me, we will likely be back in a few years and I'll be wanting all my favorite patients back! Ha  Thank you for trusting me with your care. You'll be in great hands here with Anjali.   Feel better!      ----- Message -----     From: Donna Simpson     Sent: 1/9/2020 11:11 AM EST       To: TIFFANIE Hines  Subject: RE: Prescription Question    Thank you so much. :) I feel a little better today. I'm back at work, just laying low. lol   Will you be staying within the [facility]?    Donna Martin  ----- Message -----  From: TIFFANIE Hines  Sent: 1/9/2020 10:53 AM EST  To: Donna Simpson  Subject: RE: Prescription Question  Thanks for letting me know, I'll update the dose in your chart. I'm sorry, I know this time of year is really difficult.   I hope you're feeling somewhat better from our visit. I still haven't seen your labs come back yet, but we will let you know when they do.   Delia Martin      ----- Message -----     From: Donna Simpson     Sent: 1/9/2020  9:32 AM EST       To: TIFFANIE Hines  Subject: Prescription Question    Kevin Lin,    I meant to tell you that I started taking 1.5 tabs of sertraline (75 mg). I believe it was in early/mid November because my daughter's death anniversary is December 3rd and her due date was December 26. I was starting to feel like 50 wasn't quite enough. So far, 75 mg has been  working well.

## 2020-01-11 LAB
A VAGINAE DNA VAG QL NAA+PROBE: ABNORMAL SCORE
BVAB2 DNA VAG QL NAA+PROBE: ABNORMAL SCORE
C ALBICANS DNA VAG QL NAA+PROBE: POSITIVE
C GLABRATA DNA VAG QL NAA+PROBE: NEGATIVE
C TRACH RRNA SPEC QL NAA+PROBE: NEGATIVE
HIV 1+2 AB+HIV1 P24 AG SERPL QL IA: NON REACTIVE
HSV1 IGG SER IA-ACNC: <0.91 INDEX (ref 0–0.9)
HSV1 IGM TITR SER IF: NORMAL TITER
HSV2 IGG SER IA-ACNC: <0.91 INDEX (ref 0–0.9)
HSV2 IGM TITR SER IF: NORMAL TITER
MEGA1 DNA VAG QL NAA+PROBE: ABNORMAL SCORE
N GONORRHOEA RRNA SPEC QL NAA+PROBE: NEGATIVE
RPR SER QL: NON REACTIVE
T VAGINALIS DNA SPEC QL NAA+PROBE: NEGATIVE

## 2020-01-14 ENCOUNTER — OFFICE VISIT (OUTPATIENT)
Dept: INTERNAL MEDICINE | Age: 40
End: 2020-01-14

## 2020-01-14 ENCOUNTER — TELEPHONE (OUTPATIENT)
Dept: INTERNAL MEDICINE | Age: 40
End: 2020-01-14

## 2020-01-14 VITALS
DIASTOLIC BLOOD PRESSURE: 84 MMHG | TEMPERATURE: 97.1 F | HEART RATE: 76 BPM | SYSTOLIC BLOOD PRESSURE: 118 MMHG | OXYGEN SATURATION: 98 % | BODY MASS INDEX: 26.89 KG/M2 | WEIGHT: 161.4 LBS | HEIGHT: 65 IN

## 2020-01-14 DIAGNOSIS — N94.9 GENITAL LESION, FEMALE: Primary | ICD-10-CM

## 2020-01-14 DIAGNOSIS — Z20.2 POSSIBLE EXPOSURE TO STD: ICD-10-CM

## 2020-01-14 DIAGNOSIS — B37.31 CANDIDIASIS OF VAGINA: ICD-10-CM

## 2020-01-14 PROCEDURE — 99214 OFFICE O/P EST MOD 30 MIN: CPT | Performed by: NURSE PRACTITIONER

## 2020-01-14 RX ORDER — FLUCONAZOLE 150 MG/1
150 TABLET ORAL ONCE
Qty: 1 TABLET | Refills: 0 | Status: SHIPPED | OUTPATIENT
Start: 2020-01-14 | End: 2020-01-18

## 2020-01-14 NOTE — TELEPHONE ENCOUNTER
----- Message from Donna Simpson sent at 1/13/2020  4:58 PM EST -----  Regarding: RE: Repeat Testing   Contact: [phone]  Kevin Alba!     Yes, Delia actually took a swab last week and for some reason it was cancelled because of the serum test (?), but a swab would have been more accurate, correct?      ----- Message -----  From: TIFFANIE Kaur  Sent: 1/13/20 4:35 PM  To: Donna Simpson  Subject: Repeat Testing     Donna:     Tuan! I saw that you are scheduled to come see me tomorrow for repeat testing.  According to your notes, 6 weeks from last encounter would put you in mid February for retesting, tomorrow would be too soon.  I just wanted to clarify so that you don't have to come in unnecessarily tomorrow.  However, if there is something else that she would like to discuss you can certainly keep your appointment.  Thanks.     Anjali MORENO

## 2020-01-14 NOTE — TELEPHONE ENCOUNTER
Telephone Encounter by Annie Mcmillan CMA at 03/27/17 12:02 PM     Author:  Annie Mcmillan CMA Service:  (none) Author Type:  Certified Medical Assistant     Filed:  03/27/17 12:02 PM Encounter Date:  3/27/2017 Status:  Signed     :  Annie Mcmillan CMA (Certified Medical Assistant)            Left message on answering machine to call back,[JM1.1T] choose option 3,[JM1.1M] along with hours of operation.[JM1.1T]        Revision History        User Key Date/Time User Provider Type Action    > JM1.1 03/27/17 12:02 PM Annie Mcmillan CMA Certified Medical Assistant Sign    M - Manual, T - Template             Yes, according to my resources it would have been more accurate. That is usually the gold standard.    If you still have lesions to the area, you can certainly come back in today and we can retest the viral culture.

## 2020-01-14 NOTE — PROGRESS NOTES
[facility] [place] [facility]  Anjali VEGA Simpson / 39 y.o. / female  01/14/2020      ASSESSMENT & PLAN:    Problem List Items Addressed This Visit     None      Visit Diagnoses     Genital lesion, female    -  Primary    Relevant Orders    HSV 1/2, PCR - Swab, Vagina    Possible exposure to STD        Relevant Orders    HSV 1/2, PCR - Swab, Vagina    Candidiasis of vagina        Relevant Medications    fluconazole (DIFLUCAN) 150 MG tablet        Orders Placed This Encounter   Procedures   • HSV 1/2, PCR - Swab, Vagina     New Medications Ordered This Visit   Medications   • fluconazole (DIFLUCAN) 150 MG tablet     Sig: Take 1 tablet by mouth 1 (One) Time for 1 dose.     Dispense:  1 tablet     Refill:  0       Summary/Discussion:    1. Genital lesion, female  Discussed with patient clinical presentation likely genital herpes.  She did have HSV titers done the other day, but we discussed that these are likely inaccurate as it can take up to 10 days for antibodies to be positive.  In addition, patient reports she had had previous history of fever blisters in the past but her HSV 1 and 2 IgG was negative, so I am not sure that this test result was accurate.  Attempted to reculture viral swab today, although I did discuss with patient that there is a risk of a false negative as lesions have already crusted over and are in the process of healing.  She will return office in approximately 6 weeks for annual physical and labs.  At that time, will recheck HIV, RPR, and chlamydia/GC testing. May consider repeat HSV titer if viral swab negative as I suspect initial titers were false negative.   Continue and complete previously prescribed valacyclovir.     - HSV 1/2, PCR - Swab, Vagina    2. Possible exposure to STD    - HSV 1/2, PCR - Swab, Vagina    3. Candidiasis of vagina    - fluconazole (DIFLUCAN) 150 MG tablet; Take 1 tablet by mouth 1 (One) Time for 1 dose.  Dispense: 1 tablet; Refill: 0        No  "follow-ups on file.    ____________________________________________________________________    MEDICATIONS  Current Outpatient Medications   Medication Sig Dispense Refill   • B Complex Vitamins (VITAMIN B COMPLEX PO)      • B Complex-Biotin-FA (B COMPLEX 100 TR) tablet controlled-release B COMPLEX 100 TR CR-TABS     • Collagen-Vitamin C (COLLAGEN PLUS VITAMIN C PO)      • Flaxseed, Linseed, (FLAX SEED OIL) 1000 MG capsule      • ibuprofen (EQ IBUPROFEN) 200 MG tablet EQ IBUPROFEN 200 MG TABS     • lidocaine (XYLOCAINE) 2 % jelly Apply  topically to the appropriate area as directed As Needed for Mild Pain  or Moderate Pain . 85 g 1   • lisinopril (PRINIVIL,ZESTRIL) 10 MG tablet Take 1 tablet by mouth Daily. 30 tablet 3   • magnesium oxide (MAG-OX) 400 MG tablet Take 2 tablets by mouth Daily.  0   • Melatonin 5 MG capsule      • Omega-3 Fatty Acids (FISH OIL) 1000 MG capsule capsule      • sertraline (ZOLOFT) 50 MG tablet Take 1.5 tablets by mouth Daily. 90 tablet 3   • valACYclovir (VALTREX) 1000 MG tablet Take 1 tablet by mouth 2 (Two) Times a Day for 10 days. 20 tablet 0   • vitamin E (vitamin E) 400 UNIT capsule      • fluconazole (DIFLUCAN) 150 MG tablet Take 1 tablet by mouth 1 (One) Time for 1 dose. 1 tablet 0     No current facility-administered medications for this visit.           VITALS:    Visit Vitals  /84   Pulse 76   Temp 97.1 °F (36.2 °C)   Ht 165.1 cm (65\")   Wt 73.2 kg (161 lb 6.4 oz)   SpO2 98%   BMI 26.86 kg/m²       BP Readings from Last 3 Encounters:   01/14/20 118/84   01/07/20 104/62   10/28/19 138/82     Wt Readings from Last 3 Encounters:   01/14/20 73.2 kg (161 lb 6.4 oz)   01/07/20 72.1 kg (159 lb)   10/28/19 74.8 kg (165 lb)      Body mass index is 26.86 kg/m².    CC:  Main reason(s) for today's visit: Exposure to STD      HPI:   Patient here for follow up visit STD testing from 1/7/20. This is a NEW patient as well as a NEW problem to this examiner.     She was seen by her previous " PCP Delia MORENO on 1/7/20 for genital lesions and STD screening.     Sexually Transmitted Disease Check: Patient presents for sexually transmitted disease check. Sexual history reviewed with the patient. STD exposure: sexual contact with individual with uncertain background 2 weeks ago.  Previous history of STD:  none. Current symptoms include vaginal irritation: moderate, skin lesions in perineal region, oral lesions or sore throat.  Contraception: none.    Patient had testing for HIV, syphilis, chlamydia, gonorrhea, trichomonas, and IgG IgM testing for HSV.  She reports a vaginal swab was done of the ulcers, but this order for lab was subsequently canceled for unknown reason.  She still has the lesions, although they are healing.  She did have significant discomfort and pain as well as fever with onset of lesions which started approximately 10 days ago. Vaginitis panel was positive for candidiasis, she has not yet had treatment.       Patient Care Team:  Anjali Valera APRN as PCP - General (Internal Medicine)    ____________________________________________________________________    REVIEW OF SYSTEMS    Review of Systems   Constitutional: Positive for fatigue. Negative for fever.   Genitourinary: Positive for dysuria, vaginal discharge and vaginal pain. Negative for frequency and pelvic pain.         PHYSICAL EXAMINATION    Physical Exam   Constitutional: She is oriented to person, place, and time. Vital signs are normal. She appears well-developed and well-nourished. She is cooperative. She does not appear ill. No distress.   Genitourinary:       There is rash on the right labia. There is rash on the left labia.   Genitourinary Comments: Bilateral healing rash to vulvar region, lesions have dried up    Neurological: She is alert and oriented to person, place, and time. She has normal strength.   Nursing note and vitals reviewed.      REVIEWED DATA:    Labs:     Lab Results   Component Value Date    NA  139 10/01/2019    K 3.5 10/01/2019    AST 17 10/01/2019    ALT 22 10/01/2019    BUN 14 10/01/2019    CREATININE 0.66 10/01/2019    CREATININE 0.70 09/14/2019    CREATININE 0.7 05/31/2017    EGFRIFNONA 99 10/01/2019       Lab Results   Component Value Date    GLUCOSE 104 (H) 10/01/2019    GLUCOSE 138 (H) 09/14/2019    GLUCOSE 100 (H) 05/31/2017       Lab Results   Component Value Date     (H) 10/01/2019     (H) 05/31/2017    HDL 46 10/01/2019    HDL 43 05/31/2017    TRIG 73 10/01/2019    TRIG 123 05/31/2017    CHOLHDLRATIO 4.02 10/01/2019    CHOLHDLRATIO 4.8 05/31/2017       Lab Results   Component Value Date    TSH 0.78 05/31/2017       Lab Results   Component Value Date    WBC 9.76 10/01/2019    HGB 12.2 10/01/2019    HGB 14.4 05/31/2017     10/01/2019       No results found for: PROTEIN, GLUCOSEU, BLOODU, NITRITEU, LEUKOCYTESUR    Imaging:         Medical Tests:         Summary of old records / correspondence / consultant report:         Request outside records:         ALLERGIES  Allergies   Allergen Reactions   • Penicillins Unknown (See Comments)     Unknown        PFSH:     The following portions of the patient's history were reviewed and updated as appropriate: Allergies / Current Medications / Past Medical History / Surgical History / Social History / Family History    PROBLEM LIST   Patient Active Problem List   Diagnosis   • Depression   • Hypertension   • Hypokalemia   • Never smoked any substance   • Stress at home   • Generalized anxiety disorder       PAST MEDICAL HISTORY  Past Medical History:   Diagnosis Date   • Hypertension        SURGICAL HISTORY  No past surgical history on file.    SOCIAL HISTORY  Social History     Socioeconomic History   • Marital status: Single     Spouse name: Not on file   • Number of children: Not on file   • Years of education: Not on file   • Highest education level: Not on file   Tobacco Use   • Smoking status: Never Smoker   • Smokeless tobacco:  Never Used   Substance and Sexual Activity   • Alcohol use: No     Frequency: Never   • Drug use: No       FAMILY HISTORY  History reviewed. No pertinent family history.      **Dragon Disclaimer:   Much of this encounter note is an electronic transcription/translation of spoken language to printed text. The electronic translation of spoken language may permit erroneous, or at times, nonsensical words or phrases to be inadvertently transcribed. Although I have reviewed the note for such errors, some may still exist.

## 2020-01-16 DIAGNOSIS — I10 ESSENTIAL HYPERTENSION: ICD-10-CM

## 2020-01-16 LAB
HSV1 DNA SPEC QL NAA+PROBE: NEGATIVE
HSV2 DNA SPEC QL NAA+PROBE: NEGATIVE

## 2020-01-16 RX ORDER — LISINOPRIL 10 MG/1
10 TABLET ORAL DAILY
Qty: 30 TABLET | Refills: 3 | OUTPATIENT
Start: 2020-01-16

## 2020-01-16 NOTE — TELEPHONE ENCOUNTER
I refilled it earlier today.   It took me awhile to get to RX refills today.   Please make sure patient has it sent to pharmacy.

## 2020-01-16 NOTE — TELEPHONE ENCOUNTER
----- Message from Natasha Pak MA sent at 1/16/2020  4:13 PM EST -----  Regarding: FW: Prescription Question  Contact: [phone]      ----- Message -----  From: Donna Simpson  Sent: 1/16/2020   2:53 PM EST  To: Noemi Peters [facility]  Subject: Prescription Question                            Hi Anjali! I just checked the pharmacy downstairs and they said my Sertraline Rx was discontinued. Delia had updated my Rx from 1 tab to 1.5 tabs on January 9, 2020, and I guess it somehow got DC'd? The lisinopril was not DC'd though. As of now, I have enough sertraline to last until this Saturday. Is it possible to get it refilled before the weekend? Thank you!! :)

## 2020-01-17 DIAGNOSIS — F43.9 STRESS AT HOME: ICD-10-CM

## 2020-01-17 DIAGNOSIS — F41.1 GENERALIZED ANXIETY DISORDER: ICD-10-CM

## 2020-01-17 NOTE — TELEPHONE ENCOUNTER
----- Message from Donna Simpson sent at 1/17/2020 11:04 AM EST -----  Regarding: Prescription Question  Contact: [phone]  Thank you, Anjali! :) I see that you updated the Lisinopril and Sertraline yesterday - thank you so much! I just submitted the request for refill through [employer] for the sertraline. I'm uncertain if it goes straight to the pharmacy or if it goes to you for approval first? I'll keep an eye out and check with the Pharmacy @ . Thanks! Donna

## 2020-01-21 DIAGNOSIS — Z00.00 PREVENTATIVE HEALTH CARE: Primary | ICD-10-CM

## 2020-02-08 ENCOUNTER — RESULTS ENCOUNTER (OUTPATIENT)
Dept: INTERNAL MEDICINE | Age: 40
End: 2020-02-08

## 2020-02-08 DIAGNOSIS — Z00.00 PREVENTATIVE HEALTH CARE: ICD-10-CM

## 2020-02-10 DIAGNOSIS — Z00.00 PREVENTATIVE HEALTH CARE: ICD-10-CM

## 2020-02-13 DIAGNOSIS — N76.6 GENITAL ULCER, FEMALE: Primary | ICD-10-CM

## 2020-02-13 DIAGNOSIS — Z11.3 SCREEN FOR STD (SEXUALLY TRANSMITTED DISEASE): ICD-10-CM

## 2020-02-18 LAB
ALBUMIN SERPL-MCNC: 4.6 G/DL (ref 3.8–4.8)
ALBUMIN/GLOB SERPL: 1.6 {RATIO} (ref 1.2–2.2)
ALP SERPL-CCNC: 57 IU/L (ref 39–117)
ALT SERPL-CCNC: 23 IU/L (ref 0–32)
AST SERPL-CCNC: 20 IU/L (ref 0–40)
BASOPHILS # BLD AUTO: 0 X10E3/UL (ref 0–0.2)
BASOPHILS NFR BLD AUTO: 0 %
BILIRUB SERPL-MCNC: 0.2 MG/DL (ref 0–1.2)
BUN SERPL-MCNC: 14 MG/DL (ref 6–24)
BUN/CREAT SERPL: 19 (ref 9–23)
CALCIUM SERPL-MCNC: 9.3 MG/DL (ref 8.7–10.2)
CHLORIDE SERPL-SCNC: 101 MMOL/L (ref 96–106)
CHOLEST SERPL-MCNC: 207 MG/DL (ref 100–199)
CHOLEST/HDLC SERPL: 4.3 RATIO (ref 0–4.4)
CO2 SERPL-SCNC: 23 MMOL/L (ref 20–29)
CREAT SERPL-MCNC: 0.74 MG/DL (ref 0.57–1)
EOSINOPHIL # BLD AUTO: 0.1 X10E3/UL (ref 0–0.4)
EOSINOPHIL NFR BLD AUTO: 1 %
ERYTHROCYTE [DISTWIDTH] IN BLOOD BY AUTOMATED COUNT: 16 % (ref 11.7–15.4)
GLOBULIN SER CALC-MCNC: 2.9 G/DL (ref 1.5–4.5)
GLUCOSE SERPL-MCNC: 88 MG/DL (ref 65–99)
GLUCOSE UR QL: NORMAL
HCT VFR BLD AUTO: 39.9 % (ref 34–46.6)
HCV AB S/CO SERPL IA: 0.1 S/CO RATIO (ref 0–0.9)
HDLC SERPL-MCNC: 48 MG/DL
HGB BLD-MCNC: 12.8 G/DL (ref 11.1–15.9)
HIV 1+2 AB+HIV1 P24 AG SERPL QL IA: NON REACTIVE
HSV1 IGG SER IA-ACNC: 28.8 INDEX (ref 0–0.9)
HSV1 IGM TITR SER IF: ABNORMAL TITER
HSV2 IGG SER IA-ACNC: <0.91 INDEX (ref 0–0.9)
HSV2 IGM TITR SER IF: ABNORMAL TITER
IMM GRANULOCYTES # BLD AUTO: 0 X10E3/UL (ref 0–0.1)
IMM GRANULOCYTES NFR BLD AUTO: 0 %
KETONES UR QL STRIP: NORMAL
LDLC SERPL CALC-MCNC: 138 MG/DL (ref 0–99)
LYMPHOCYTES # BLD AUTO: 2.3 X10E3/UL (ref 0.7–3.1)
LYMPHOCYTES NFR BLD AUTO: 31 %
MCH RBC QN AUTO: 25.2 PG (ref 26.6–33)
MCHC RBC AUTO-ENTMCNC: 32.1 G/DL (ref 31.5–35.7)
MCV RBC AUTO: 79 FL (ref 79–97)
MONOCYTES # BLD AUTO: 0.5 X10E3/UL (ref 0.1–0.9)
MONOCYTES NFR BLD AUTO: 6 %
NEUTROPHILS # BLD AUTO: 4.5 X10E3/UL (ref 1.4–7)
NEUTROPHILS NFR BLD AUTO: 62 %
PH UR STRIP: NORMAL [PH]
PLATELET # BLD AUTO: 298 X10E3/UL (ref 150–450)
POTASSIUM SERPL-SCNC: 4.2 MMOL/L (ref 3.5–5.2)
PROT SERPL-MCNC: 7.5 G/DL (ref 6–8.5)
PROT UR QL STRIP: NORMAL
RBC # BLD AUTO: 5.07 X10E6/UL (ref 3.77–5.28)
SODIUM SERPL-SCNC: 139 MMOL/L (ref 134–144)
SP GR UR: NORMAL
SPECIMEN STATUS: NORMAL
TRIGL SERPL-MCNC: 103 MG/DL (ref 0–149)
TSH SERPL DL<=0.005 MIU/L-ACNC: 0.93 UIU/ML (ref 0.45–4.5)
UNABLE TO VOID: NORMAL
VLDLC SERPL CALC-MCNC: 21 MG/DL (ref 5–40)
WBC # BLD AUTO: 7.4 X10E3/UL (ref 3.4–10.8)

## 2020-02-21 ENCOUNTER — OFFICE VISIT (OUTPATIENT)
Dept: INTERNAL MEDICINE | Age: 40
End: 2020-02-21

## 2020-02-21 VITALS
HEART RATE: 83 BPM | SYSTOLIC BLOOD PRESSURE: 110 MMHG | WEIGHT: 163.2 LBS | BODY MASS INDEX: 27.19 KG/M2 | HEIGHT: 65 IN | OXYGEN SATURATION: 98 % | TEMPERATURE: 99.2 F | DIASTOLIC BLOOD PRESSURE: 76 MMHG

## 2020-02-21 DIAGNOSIS — Z00.00 ROUTINE ADULT HEALTH MAINTENANCE: ICD-10-CM

## 2020-02-21 DIAGNOSIS — L70.0 ACNE VULGARIS: ICD-10-CM

## 2020-02-21 DIAGNOSIS — Z00.00 ANNUAL PHYSICAL EXAM: Primary | ICD-10-CM

## 2020-02-21 DIAGNOSIS — B00.9 HSV-1 INFECTION: ICD-10-CM

## 2020-02-21 DIAGNOSIS — E78.5 HYPERLIPIDEMIA, UNSPECIFIED HYPERLIPIDEMIA TYPE: ICD-10-CM

## 2020-02-21 PROCEDURE — 99214 OFFICE O/P EST MOD 30 MIN: CPT | Performed by: NURSE PRACTITIONER

## 2020-02-21 RX ORDER — ADAPALENE 45 G/G
GEL TOPICAL NIGHTLY
Qty: 45 G | Refills: 2 | Status: SHIPPED | OUTPATIENT
Start: 2020-02-21

## 2020-02-21 NOTE — PROGRESS NOTES
[facility] [place] INTERNAL MEDICINE        Donna VEGA Simpson / 39 y.o. / female  02/21/2020    CC:  Annual Exam (CPE )      HPI:      Donna presents for annual health maintenance visit.    Acne: Patient presents for evaluation of acne.  Onset was several weeks ago. Symptoms have gradually worsened. Lesions are described as closed comedones. Acne is primarily located on the cheeks. The patient also describes no change from last visit. Treatment to date has included special light treatment        · Last health maintenance visit: unsure  · General health: good  · Lifestyle:  · Attempting to lose weight?: No   · Diet: reports increased chocolate intake recently   · Exercise: does not exercise  · Tobacco: Never used   · Alcohol: occasional/rare  · Work: Full-time    · Reproductive health:  · Sexually active?: Yes   · Sexual problems?: No problems  · Concern for STD?: No    · Sees Gynecologist?: Yes   · Mary Kate/Postmenopausal?: No   · Domestic abuse concerns: No   · Depression Screening:      PHQ-2/PHQ-9 Depression Screening 10/28/2019   Little interest or pleasure in doing things 1   Feeling down, depressed, or hopeless 1   Trouble falling or staying asleep, or sleeping too much 0   Feeling tired or having little energy 1   Poor appetite or overeating 0   Feeling bad about yourself - or that you are a failure or have let yourself or your family down 1   Trouble concentrating on things, such as reading the newspaper or watching television 1   Moving or speaking so slowly that other people could have noticed. Or the opposite - being so fidgety or restless that you have been moving around a lot more than usual 0   Thoughts that you would be better off dead, or of hurting yourself in some way 0   Total Score 5   If you checked off any problems, how difficult have these problems made it for you to do your work, take care of things at home, or get along with other people? -         PHQ-2: N/A (Has diagnosis of depression and is on  treatment)   PHQ-9: 1-4 (Minimal Depression)    Patient Care Team:  Anjali Valera APRN as PCP - General (Internal Medicine)  René Leung MD as Consulting Physician (Obstetrics and Gynecology)  ______________________________________________________________________    ALLERGIES  Allergies   Allergen Reactions   • Penicillins Unknown (See Comments)     Unknown        MEDICATIONS  Current Outpatient Medications on File Prior to Visit   Medication Sig   • B Complex-Biotin-FA (B COMPLEX 100 TR) tablet controlled-release B COMPLEX 100 TR CR-TABS   • Collagen-Vitamin C (COLLAGEN PLUS VITAMIN C PO)    • Flaxseed, Linseed, (FLAX SEED OIL) 1000 MG capsule    • ibuprofen (EQ IBUPROFEN) 200 MG tablet EQ IBUPROFEN 200 MG TABS   • lisinopril (PRINIVIL,ZESTRIL) 10 MG tablet Take 1 tablet by mouth Daily.   • magnesium oxide (MAG-OX) 400 MG tablet Take 2 tablets by mouth Daily.   • Melatonin 5 MG capsule    • Omega-3 Fatty Acids (FISH OIL) 1000 MG capsule capsule    • sertraline (ZOLOFT) 50 MG tablet Take 1.5 tablets by mouth Daily. (Patient taking differently: Take 50 mg by mouth Daily.)   • vitamin E (vitamin E) 400 UNIT capsule    • [DISCONTINUED] B Complex Vitamins (VITAMIN B COMPLEX PO)    • [DISCONTINUED] lidocaine (XYLOCAINE) 2 % jelly Apply  topically to the appropriate area as directed As Needed for Mild Pain  or Moderate Pain .   • [DISCONTINUED] lisinopril (PRINIVIL,ZESTRIL) 10 MG tablet Take 1 tablet by mouth Daily.   • [DISCONTINUED] sertraline (ZOLOFT) 50 MG tablet Take 1.5 tablets by mouth Daily.     No current facility-administered medications on file prior to visit.        PFSH:     The following portions of the patient's history were reviewed and updated as appropriate: Allergies / Current Medications / Past Medical History / Surgical History / Social History / Family History    PROBLEM LIST   Patient Active Problem List   Diagnosis   • Depression   • Hypertension   • Hypokalemia   • Stress at home    • Generalized anxiety disorder   • HSV-1 infection   • Hyperlipidemia   • Acne vulgaris       PAST MEDICAL HISTORY  Past Medical History:   Diagnosis Date   • Hypertension        SURGICAL HISTORY  History reviewed. No pertinent surgical history.    SOCIAL HISTORY  Social History     Socioeconomic History   • Marital status: Single     Spouse name: Not on file   • Number of children: Not on file   • Years of education: Not on file   • Highest education level: Not on file   Tobacco Use   • Smoking status: Never Smoker   • Smokeless tobacco: Never Used   Substance and Sexual Activity   • Alcohol use: No     Frequency: Never   • Drug use: No       FAMILY HISTORY  Family History   Problem Relation Age of Onset   • Hyperlipidemia Mother    • Seizures Mother    • Anxiety disorder Mother    • Depression Mother    • Heart disease Father    • Diabetes Father    • Peripheral vascular disease Father    • Hypertension Father    • Hyperlipidemia Father    • Colon polyps Father    • Anxiety disorder Sister    • Depression Sister    • No Known Problems Brother        IMMUNIZATION HISTORY  Immunization History   Administered Date(s) Administered   • Hepatitis A 04/19/2019   • Influenza, Unspecified 10/02/2019   • Tdap 01/06/2014       ______________________________________________________________________    REVIEW OF SYSTEMS    Review of Systems   Constitutional: Negative for activity change, appetite change, fatigue, fever and unexpected weight change.   HENT: Negative for congestion, ear pain, sore throat and trouble swallowing.    Eyes: Negative for visual disturbance.   Respiratory: Negative for shortness of breath.    Cardiovascular: Negative for chest pain and leg swelling.   Gastrointestinal: Negative for abdominal pain, blood in stool, constipation, diarrhea, nausea and vomiting.   Endocrine: Negative for polydipsia, polyphagia and polyuria.   Genitourinary: Negative for dysuria, pelvic pain, vaginal bleeding and vaginal  "discharge.   Musculoskeletal: Negative for arthralgias, back pain and gait problem.   Neurological: Negative for dizziness, weakness, numbness and headaches.   Hematological: Negative for adenopathy.   Psychiatric/Behavioral: Negative for confusion. The patient is not nervous/anxious.          VITALS:    Visit Vitals  /76   Pulse 83   Temp 99.2 °F (37.3 °C)   Ht 165.1 cm (65\")   Wt 74 kg (163 lb 3.2 oz)   SpO2 98%   BMI 27.16 kg/m²       BP Readings from Last 3 Encounters:   02/21/20 110/76   01/14/20 118/84   01/07/20 104/62     Wt Readings from Last 3 Encounters:   02/21/20 74 kg (163 lb 3.2 oz)   01/14/20 73.2 kg (161 lb 6.4 oz)   01/07/20 72.1 kg (159 lb)      Body mass index is 27.16 kg/m².    PHYSICAL EXAMINATION    Physical Exam   Constitutional: She is oriented to person, place, and time. Vital signs are normal. She appears well-developed and well-nourished. She is cooperative. She does not appear ill. No distress.   HENT:   Head: Normocephalic and atraumatic.   Right Ear: Hearing, tympanic membrane, external ear and ear canal normal.   Left Ear: Hearing, tympanic membrane, external ear and ear canal normal.   Nose: Nose normal.   Mouth/Throat: Uvula is midline, oropharynx is clear and moist and mucous membranes are normal. No oral lesions. Tonsils are 0 on the right. Tonsils are 0 on the left.   Eyes: Pupils are equal, round, and reactive to light. EOM are normal.   Neck: Full passive range of motion without pain. Carotid bruit is not present. No thyroid mass and no thyromegaly present.   Cardiovascular: Normal rate, regular rhythm, S1 normal and normal heart sounds.   No murmur heard.  Pulmonary/Chest: Effort normal and breath sounds normal. She has no decreased breath sounds. She has no wheezes. She has no rhonchi. She has no rales.   Abdominal: Soft. Normal appearance and bowel sounds are normal. She exhibits no abdominal bruit. There is no hepatosplenomegaly. There is no tenderness. "   Genitourinary:   Genitourinary Comments: GYN/CBE exam deferred to gynecology     Lymphadenopathy:     She has no cervical adenopathy.     She has no axillary adenopathy.        Right: No supraclavicular adenopathy present.        Left: No supraclavicular adenopathy present.   Neurological: She is alert and oriented to person, place, and time. She has normal strength. She is not disoriented. No cranial nerve deficit or sensory deficit.   Reflex Scores:       Bicep reflexes are 2+ on the right side and 2+ on the left side.       Brachioradialis reflexes are 2+ on the right side and 2+ on the left side.       Patellar reflexes are 2+ on the right side and 2+ on the left side.       Achilles reflexes are 2+ on the right side and 2+ on the left side.  Skin: Skin is warm, dry and intact.   Psychiatric: She has a normal mood and affect. Her speech is normal and behavior is normal. Judgment and thought content normal. Cognition and memory are normal.   Nursing note and vitals reviewed.        REVIEWED DATA    Labs:    Lab Results   Component Value Date     02/13/2020    K 4.2 02/13/2020    AST 20 02/13/2020    ALT 23 02/13/2020    BUN 14 02/13/2020    CREATININE 0.74 02/13/2020    CREATININE 0.66 10/01/2019    CREATININE 0.70 09/14/2019    EGFRIFNONA 101 02/13/2020    EGFRIFAFRI 116 02/13/2020       Lab Results   Component Value Date    GLUCOSE 104 (H) 10/01/2019    TSH 0.928 02/13/2020       Lab Results   Component Value Date     (H) 02/13/2020    HDL 48 02/13/2020    TRIG 103 02/13/2020    CHOLHDLRATIO 4.3 02/13/2020       No results found for: [id number]     Lab Results   Component Value Date    WBC 7.4 02/13/2020    HGB 12.8 02/13/2020    MCV 79 02/13/2020     02/13/2020       Lab Results   Component Value Date    PROTEIN CANCELED 02/13/2020    GLUCOSEU CANCELED 02/13/2020          Lab Results   Component Value Date    HEPCVIRUSABY 0.1 02/13/2020       Imaging:        Medical  Tests:        ______________________________________________________________________    ASSESSMENT & PLAN    ANNUAL WELLNESS EXAM / PHYSICAL     Other medical problems addressed today:      Summary/Discussion:       1. Annual physical exam      2. Routine adult health maintenance      3. Acne vulgaris  Continue use of over-the-counter benzoyl peroxide cleanser.  May apply thin layer of adapalene gel to entire face at bedtime.  Discussed possible irritation with medicine Acacian as a side effect, should improve.  Avoid excessive sun exposure as this does increase photosensitivity.    - adapalene (DIFFERIN) 0.1 % gel; Apply  topically to the appropriate area as directed Every Night.  Dispense: 45 g; Refill: 2    4. HSV-1 infection      5. Hyperlipidemia, unspecified hyperlipidemia type    Work on reduction of carbs/sweets/chocolate in diet.         Return in about 4 months (around 6/21/2020) for Next scheduled follow up.    Future Appointments   Date Time Provider Department Center   6/19/2020  7:45 AM Anjali Valera, [facility] None       HEALTHCARE MAINTENANCE ISSUES:    Cancer Screening:  · Colon: Initial/Next screening at age: 43  · Repeat colon cancer screening: N/A at this time  · Breast: Recommended monthly self exams; annual professional exam  · Mammogram: every 1 year  · Cervical: 1 year or pelvic exam as recommended by gyne  · Skin: Monthly self skin examination, annual exam by health professional  · Lung:   · Other:    Screening Labs & Tests:  · Lab results reviewed & discussed with the patient or test orders placed today.  · EKG:  · Vascular Screening:   · DEXA (65+ or postmenopausal with risk factors):   · HEP C (If born [id number], or risk factors): Not indicated    Immunization/Vaccinations (to be given today unless deferred by patient)  · Influenza: Patient had the flu shot this season  · Hepatitis A: Not needed at this time  · Tetanus/Pertussis: Up to date  · Pneumovax: Not needed at this  time  · Prevnar 13: Not needed at this time  · Shingles: Not needed at this time  · Other:     Lifestyle Counseling:  · Lifestyle Modifications: Attempt to lose weight, Improve dietary compliance, Begin progressive aerobic exercise program 3-5 days a week, Increase intensity/regularity of aerobic exercise, Reduce exposure to stress and Discussed safe sex practices, contraception  · Safety Issues: Always wear seatbelt, Avoid texting while driving   · Use sunscreen, regular skin examination  · Recommended annual dental/vision examination.  · Emotional/Stress/Sleep: Reviewed and  given when appropriate      Health Maintenance   Topic Date Due   • ANNUAL PHYSICAL  10/05/1981   • LIPID PANEL  02/13/2021   • PAP SMEAR  08/01/2021   • TDAP/TD VACCINES (2 - Td) 01/06/2024   • INFLUENZA VACCINE  Completed         **Lance Disclaimer:   Much of this encounter note is an electronic transcription/translation of spoken language to printed text. The electronic translation of spoken language may permit erroneous, or at times, nonsensical words or phrases to be inadvertently transcribed. Although I have reviewed the note for such errors, some may still exist.

## 2020-02-21 NOTE — PATIENT INSTRUCTIONS

## 2020-03-11 ENCOUNTER — TELEPHONE (OUTPATIENT)
Dept: INTERNAL MEDICINE | Age: 40
End: 2020-03-11

## 2020-03-11 DIAGNOSIS — B00.9 HSV-1 INFECTION: Primary | ICD-10-CM

## 2020-03-11 RX ORDER — VALACYCLOVIR HYDROCHLORIDE 500 MG/1
500 TABLET, FILM COATED ORAL 2 TIMES DAILY
Qty: 6 TABLET | Refills: 3 | Status: SHIPPED | OUTPATIENT
Start: 2020-03-11 | End: 2020-03-14

## 2020-03-11 NOTE — TELEPHONE ENCOUNTER
Contact patient.     I have send in RX for dosing for recurrent outbreak with refills as needed.

## 2020-03-11 NOTE — TELEPHONE ENCOUNTER
----- Message from Donna Simpson sent at 3/10/2020 10:54 PM EDT -----  Regarding: Prescription Question  Contact: [phone]  Kevin Alba, it feels/appears like I'm having another outbreak :(   We had talked about an Acyclovir Rx last appointment. Can I please get a Rx before it spreads? Thank you so much!!    Donna Simpson

## 2020-06-19 ENCOUNTER — OFFICE VISIT (OUTPATIENT)
Dept: INTERNAL MEDICINE | Age: 40
End: 2020-06-19

## 2020-06-19 ENCOUNTER — LAB (OUTPATIENT)
Dept: LAB | Facility: HOSPITAL | Age: 40
End: 2020-06-19

## 2020-06-19 VITALS
OXYGEN SATURATION: 98 % | WEIGHT: 171 LBS | BODY MASS INDEX: 28.49 KG/M2 | HEART RATE: 75 BPM | SYSTOLIC BLOOD PRESSURE: 112 MMHG | TEMPERATURE: 97.3 F | DIASTOLIC BLOOD PRESSURE: 62 MMHG | HEIGHT: 65 IN

## 2020-06-19 DIAGNOSIS — I10 ESSENTIAL HYPERTENSION: ICD-10-CM

## 2020-06-19 DIAGNOSIS — B00.9 HSV-1 INFECTION: Primary | ICD-10-CM

## 2020-06-19 DIAGNOSIS — E78.5 HYPERLIPIDEMIA, UNSPECIFIED HYPERLIPIDEMIA TYPE: ICD-10-CM

## 2020-06-19 LAB
CHOLEST SERPL-MCNC: 201 MG/DL (ref 0–200)
HDLC SERPL QL: 4.47
HDLC SERPL-MCNC: 45 MG/DL (ref 40–60)
LDLC SERPL CALC-MCNC: 140 MG/DL (ref 0–100)
TRIGL SERPL-MCNC: 82 MG/DL (ref 0–150)
VLDLC SERPL-MCNC: 16.4 MG/DL (ref 5–40)

## 2020-06-19 PROCEDURE — 36415 COLL VENOUS BLD VENIPUNCTURE: CPT | Performed by: NURSE PRACTITIONER

## 2020-06-19 PROCEDURE — 80061 LIPID PANEL: CPT | Performed by: NURSE PRACTITIONER

## 2020-06-19 PROCEDURE — 99214 OFFICE O/P EST MOD 30 MIN: CPT | Performed by: NURSE PRACTITIONER

## 2020-06-19 RX ORDER — VALACYCLOVIR HYDROCHLORIDE 500 MG/1
500 TABLET, FILM COATED ORAL 2 TIMES DAILY PRN
Qty: 30 TABLET | Refills: 5 | Status: SHIPPED | OUTPATIENT
Start: 2020-06-19

## 2020-06-19 NOTE — PROGRESS NOTES
[facility] [place] [facility] MEDICINE  Anjali VEGA Simpson / 39 y.o. / female  06/19/2020      ASSESSMENT & PLAN:    Problem List Items Addressed This Visit        Cardiovascular and Mediastinum    Hypertension    Relevant Medications    lisinopril (PRINIVIL,ZESTRIL) 10 MG tablet    Hyperlipidemia    Relevant Orders    Lipid Panel With / Chol / HDL Ratio       Other    HSV-1 infection - Primary    Relevant Medications    valACYclovir (Valtrex) 500 MG tablet        Orders Placed This Encounter   Procedures   • Lipid Panel With / Chol / HDL Ratio     New Medications Ordered This Visit   Medications   • valACYclovir (Valtrex) 500 MG tablet     Sig: Take 1 tablet by mouth 2 (Two) Times a Day As Needed (OUTBREAK).     Dispense:  30 tablet     Refill:  5       Summary/Discussion:    1. HSV-1 infection  Recommended taking prophylactic Valtrex twice a day for a few days prior to and after the end of her menses to help prevent outbreaks.    - valACYclovir (Valtrex) 500 MG tablet; Take 1 tablet by mouth 2 (Two) Times a Day As Needed (OUTBREAK).  Dispense: 30 tablet; Refill: 5    2. Hyperlipidemia, unspecified hyperlipidemia type  Recheck FLP today.   Continue to follow and improve on low-fat, low carbohydrate diet.     - Lipid Panel With / Chol / HDL Ratio    3. Essential hypertension  Blood pressure stable on current therapy, continue same.   Follow-up 6 months.    Continue lifestyle modifications for high blood pressure, high cholesterol, and increased weight. Decrease/eliminate soda, caffeine, alcohol and overall caloric intake. Reduce carbohydrates and sweets in diet.  Continue to improve dietary habits with lean proteins, fresh vegetables, fruits, and nuts. Improve aerobic exercise: walking/biking/swimming daily as tolerated, recommend 30 minutes/day at least 5 days/week.         Return in about 6 months (around 12/19/2020) for Next scheduled follow  "up.  ____________________________________________________________________    MEDICATIONS  Current Outpatient Medications   Medication Sig Dispense Refill   • adapalene (DIFFERIN) 0.1 % gel Apply  topically to the appropriate area as directed Every Night. 45 g 2   • B Complex-Biotin-FA (B COMPLEX 100 TR) tablet controlled-release B COMPLEX 100 TR CR-TABS     • Collagen-Vitamin C (COLLAGEN PLUS VITAMIN C PO)      • Flaxseed, Linseed, (FLAX SEED OIL) 1000 MG capsule      • ibuprofen (EQ IBUPROFEN) 200 MG tablet EQ IBUPROFEN 200 MG TABS     • lisinopril (PRINIVIL,ZESTRIL) 10 MG tablet Take 1 tablet by mouth Daily. 90 tablet 1   • magnesium oxide (MAG-OX) 400 MG tablet Take 2 tablets by mouth Daily.  0   • Melatonin 5 MG capsule      • Omega-3 Fatty Acids (FISH OIL) 1000 MG capsule capsule      • sertraline (ZOLOFT) 50 MG tablet Take 1.5 tablets by mouth Daily. (Patient taking differently: Take 50 mg by mouth Daily.) 135 tablet 1   • valACYclovir (Valtrex) 500 MG tablet Take 1 tablet by mouth 2 (Two) Times a Day As Needed (OUTBREAK). 30 tablet 5   • vitamin E (vitamin E) 400 UNIT capsule        No current facility-administered medications for this visit.        VITALS    Visit Vitals  /62   Pulse 75   Temp 97.3 °F (36.3 °C) (Temporal)   Ht 165.1 cm (65\")   Wt 77.6 kg (171 lb)   LMP 06/10/2020   SpO2 98%   BMI 28.46 kg/m²       BP Readings from Last 3 Encounters:   06/19/20 112/62   02/21/20 110/76   01/14/20 118/84     Wt Readings from Last 3 Encounters:   06/19/20 77.6 kg (171 lb)   02/21/20 74 kg (163 lb 3.2 oz)   01/14/20 73.2 kg (161 lb 6.4 oz)      Body mass index is 28.46 kg/m².    CC:  Main reason(s) for today's visit: Follow-up for hypertension and hyperlipidemia    HPI:   Chronic essential hypertension:  Since prior visit: compliant with medication(s), does not check blood pressure at home and denies significant problems with medication(s). Currently taking lisinopril (Prinivil). She is not exercising, but " is adherent to low sodium diet.She denies symptoms of chest pain/pressure, dyspnea, swelling, vision impairment. CVD risk factors include: advanced age (>55 for males, >65 for females), dyslipidemia, HTN, obesity (BMI>=30 kg/m2), and sedentary lifestyle. Use of agents associated with HTN: no tobacco use . Most recent in-office blood pressure readings:   BP Readings from Last 3 Encounters:   06/19/20 112/62   02/21/20 110/76   01/14/20 118/84       Chronic hyperlipidemia:  Current therapy include no prior medication.    Most recent labs:   Lab Results   Component Value Date     (H) 02/13/2020     (H) 10/01/2019     (H) 05/31/2017    HDL 48 02/13/2020    HDL 46 10/01/2019    HDL 43 05/31/2017    TRIG 103 02/13/2020    CHOLHDLRATIO 4.3 02/13/2020    CHOLHDLRATIO 4.02 10/01/2019    CHOLHDLRATIO 4.8 05/31/2017        She also reports she continues to have episodic small outbreaks of HSV after her periods (has happened 2-3 times now).     Patient Care Team:  Anjali Valera APRN as PCP - General (Internal Medicine)  Madhu, René Whatley MD as Consulting Physician (Obstetrics and Gynecology)  ____________________________________________________________________      REVIEW OF SYSTEMS    Review of Systems   Constitutional: Negative for activity change, appetite change and unexpected weight change.   HENT: Negative for tinnitus.    Eyes: Negative for visual disturbance.   Respiratory: Negative for cough, chest tightness and shortness of breath.    Cardiovascular: Negative for chest pain, palpitations and leg swelling.   Neurological: Negative for dizziness, light-headedness and headaches.         PHYSICAL EXAMINATION    Physical Exam   Constitutional: She is oriented to person, place, and time. Vital signs are normal. She appears well-developed and well-nourished. She is cooperative. She does not appear ill. No distress.   Cardiovascular: Normal rate, regular rhythm, S1 normal, S2 normal and normal  heart sounds.   No murmur heard.  Pulmonary/Chest: Effort normal and breath sounds normal. She has no decreased breath sounds. She has no wheezes. She has no rhonchi. She has no rales.   Neurological: She is alert and oriented to person, place, and time.   Skin: Skin is warm, dry and intact.   Psychiatric: She has a normal mood and affect. Her speech is normal and behavior is normal. Judgment and thought content normal. Cognition and memory are normal.   Nursing note and vitals reviewed.      REVIEWED DATA:    Labs:   Lab Results   Component Value Date     02/13/2020    K 4.2 02/13/2020    AST 20 02/13/2020    ALT 23 02/13/2020    BUN 14 02/13/2020    CREATININE 0.74 02/13/2020    CREATININE 0.66 10/01/2019    CREATININE 0.70 09/14/2019    EGFRIFNONA 101 02/13/2020    EGFRIFAFRI 116 02/13/2020       Lab Results   Component Value Date    GLUCOSE 104 (H) 10/01/2019    GLUCOSE 138 (H) 09/14/2019    GLUCOSE 100 (H) 05/31/2017       Lab Results   Component Value Date     (H) 02/13/2020     (H) 10/01/2019     (H) 05/31/2017    HDL 48 02/13/2020    HDL 46 10/01/2019    HDL 43 05/31/2017    TRIG 103 02/13/2020    TRIG 73 10/01/2019    TRIG 123 05/31/2017    CHOLHDLRATIO 4.3 02/13/2020    CHOLHDLRATIO 4.02 10/01/2019    CHOLHDLRATIO 4.8 05/31/2017       Lab Results   Component Value Date    TSH 0.928 02/13/2020          Lab Results   Component Value Date    WBC 7.4 02/13/2020    HGB 12.8 02/13/2020    HGB 12.2 10/01/2019    HGB 14.4 05/31/2017     02/13/2020       Lab Results   Component Value Date    PROTEIN CANCELED 02/13/2020    GLUCOSEU CANCELED 02/13/2020       Imaging:        Medical Tests:        Summary of old records / correspondence / consultant report:        Request outside records:        ALLERGIES  Allergies   Allergen Reactions   • Penicillins Unknown (See Comments)     Unknown        PFSH:     The following portions of the patient's history were reviewed and updated as  appropriate: Allergies / Current Medications / Past Medical History / Surgical History / Social History / Family History    PROBLEM LIST   Patient Active Problem List   Diagnosis   • Depression   • Hypertension   • Hypokalemia   • Stress at home   • Generalized anxiety disorder   • HSV-1 infection   • Hyperlipidemia   • Acne vulgaris       PAST MEDICAL HISTORY  Past Medical History:   Diagnosis Date   • Hypertension        SURGICAL HISTORY  History reviewed. No pertinent surgical history.    SOCIAL HISTORY  Social History     Socioeconomic History   • Marital status: Single     Spouse name: Not on file   • Number of children: Not on file   • Years of education: Not on file   • Highest education level: Not on file   Tobacco Use   • Smoking status: Never Smoker   • Smokeless tobacco: Never Used   Substance and Sexual Activity   • Alcohol use: No     Frequency: Never   • Drug use: No       FAMILY HISTORY  Family History   Problem Relation Age of Onset   • Hyperlipidemia Mother    • Seizures Mother    • Anxiety disorder Mother    • Depression Mother    • Heart disease Father    • Diabetes Father    • Peripheral vascular disease Father    • Hypertension Father    • Hyperlipidemia Father    • Colon polyps Father    • Anxiety disorder Sister    • Depression Sister    • No Known Problems Brother

## 2020-10-16 ENCOUNTER — OFFICE VISIT (OUTPATIENT)
Dept: INTERNAL MEDICINE | Age: 40
End: 2020-10-16

## 2020-10-16 VITALS
HEART RATE: 98 BPM | BODY MASS INDEX: 29.16 KG/M2 | SYSTOLIC BLOOD PRESSURE: 120 MMHG | TEMPERATURE: 97.3 F | HEIGHT: 65 IN | OXYGEN SATURATION: 98 % | DIASTOLIC BLOOD PRESSURE: 80 MMHG | WEIGHT: 175 LBS

## 2020-10-16 DIAGNOSIS — Z71.1 CONCERN ABOUT STD IN FEMALE WITHOUT DIAGNOSIS: Primary | ICD-10-CM

## 2020-10-16 PROCEDURE — 99213 OFFICE O/P EST LOW 20 MIN: CPT | Performed by: NURSE PRACTITIONER

## 2020-10-16 NOTE — PROGRESS NOTES
[facility] [place] [facility]  Anjali VEGA Simpson / 40 y.o. / female  10/16/2020      ASSESSMENT & PLAN:    Problem List Items Addressed This Visit     None      Visit Diagnoses     Concern about STD in female without diagnosis    -  Primary    Relevant Orders    Chlamydia trachomatis, Neisseria gonorrhoeae, Trichomonas vaginalis, PCR - Urine, Urine, Clean Catch    Hepatitis C Antibody    HIV-1 / O / 2 Ag / Antibody 4th Generation    RPR        Orders Placed This Encounter   Procedures   • Chlamydia trachomatis, Neisseria gonorrhoeae, Trichomonas vaginalis, PCR - Urine, Urine, Clean Catch   • Hepatitis C Antibody   • HIV-1 / O / 2 Ag / Antibody 4th Generation   • RPR     No orders of the defined types were placed in this encounter.      Summary/Discussion:    1. Concern about STD in female without diagnosis  Asymptomatic.  Full STD screening today.  Advised patient to return office for lab visit in approximately 4 to 5 weeks for repeat HIV and hepatitis C testing as this can take 6 to 8 weeks to show up in the blood if present.    - Chlamydia trachomatis, Neisseria gonorrhoeae, Trichomonas vaginalis, PCR - Urine, Urine, Clean Catch  - Hepatitis C Antibody  - HIV-1 / O / 2 Ag / Antibody 4th Generation  - RPR        Return in about 5 weeks (around 11/20/2020) for Lab appointment.    ____________________________________________________________________    MEDICATIONS  Current Outpatient Medications   Medication Sig Dispense Refill   • adapalene (DIFFERIN) 0.1 % gel Apply  topically to the appropriate area as directed Every Night. 45 g 2   • B Complex-Biotin-FA (B COMPLEX 100 TR) tablet controlled-release B COMPLEX 100 TR CR-TABS     • Collagen-Vitamin C (COLLAGEN PLUS VITAMIN C PO)      • Flaxseed, Linseed, (FLAX SEED OIL) 1000 MG capsule      • ibuprofen (EQ IBUPROFEN) 200 MG tablet EQ IBUPROFEN 200 MG TABS     • lisinopril (PRINIVIL,ZESTRIL) 10 MG tablet Take 1 tablet by mouth Daily. 90 tablet 1   •  "magnesium oxide (MAG-OX) 400 MG tablet Take 2 tablets by mouth Daily.  0   • Melatonin 5 MG capsule      • Omega-3 Fatty Acids (FISH OIL) 1000 MG capsule capsule      • sertraline (ZOLOFT) 50 MG tablet Take 1.5 tablets by mouth Daily. 135 tablet 1   • valACYclovir (Valtrex) 500 MG tablet Take 1 tablet by mouth 2 (Two) Times a Day As Needed (OUTBREAK). 30 tablet 5   • vitamin E (vitamin E) 400 UNIT capsule        No current facility-administered medications for this visit.           VITALS:    Visit Vitals  /80   Pulse 98   Temp 97.3 °F (36.3 °C) (Temporal)   Ht 165.1 cm (65\")   Wt 79.4 kg (175 lb)   LMP 10/02/2020   SpO2 98%   BMI 29.12 kg/m²       BP Readings from Last 3 Encounters:   10/16/20 120/80   06/19/20 112/62   02/21/20 110/76     Wt Readings from Last 3 Encounters:   10/16/20 79.4 kg (175 lb)   06/19/20 77.6 kg (171 lb)   02/21/20 74 kg (163 lb 3.2 oz)      Body mass index is 29.12 kg/m².    CC:  Main reason(s) for today's visit: Exposure to STD      HPI:     Sexually Transmitted Disease Check: Patient presents for sexually transmitted disease check. Sexual history reviewed with the patient. STD exposure: contact with partner who has had multiple partners since January, last contact was 3 weeks ago.  Previous history of STD:  HSV. Current symptoms include none.  Contraception: none.      Patient Care Team:  Anjali Valera APRN as PCP - General (Internal Medicine)  Isabela, René Whatley MD as Consulting Physician (Obstetrics and Gynecology)    ____________________________________________________________________    REVIEW OF SYSTEMS    Review of Systems   Constitutional: Negative for chills and fever.   Genitourinary: Negative for dysuria, pelvic pain, vaginal discharge and vaginal pain.         PHYSICAL EXAMINATION    Physical Exam  Vitals signs and nursing note reviewed.   Constitutional:       General: She is not in acute distress.     Appearance: She is well-developed. She is not " ill-appearing.   Neurological:      Mental Status: She is alert and oriented to person, place, and time.   Psychiatric:         Behavior: Behavior is cooperative.         REVIEWED DATA:    Labs:     Lab Results   Component Value Date     02/13/2020    K 4.2 02/13/2020    AST 20 02/13/2020    ALT 23 02/13/2020    BUN 14 02/13/2020    CREATININE 0.74 02/13/2020    CREATININE 0.66 10/01/2019    CREATININE 0.70 09/14/2019    EGFRIFNONA 101 02/13/2020    EGFRIFAFRI 116 02/13/2020       Lab Results   Component Value Date    GLUCOSE 104 (H) 10/01/2019    GLUCOSE 138 (H) 09/14/2019    GLUCOSE 100 (H) 05/31/2017       Lab Results   Component Value Date     (H) 06/19/2020     (H) 02/13/2020     (H) 10/01/2019    HDL 45 06/19/2020    HDL 48 02/13/2020    HDL 46 10/01/2019    TRIG 82 06/19/2020    TRIG 103 02/13/2020    TRIG 73 10/01/2019    CHOLHDLRATIO 4.47 06/19/2020    CHOLHDLRATIO 4.3 02/13/2020    CHOLHDLRATIO 4.02 10/01/2019       Lab Results   Component Value Date    TSH 0.928 02/13/2020       Lab Results   Component Value Date    WBC 7.4 02/13/2020    HGB 12.8 02/13/2020    HGB 12.2 10/01/2019    HGB 14.4 05/31/2017     02/13/2020       Lab Results   Component Value Date    PROTEIN CANCELED 02/13/2020    GLUCOSEU CANCELED 02/13/2020       Imaging:         Medical Tests:         Summary of old records / correspondence / consultant report:         Request outside records:         ALLERGIES  Allergies   Allergen Reactions   • Penicillins Unknown (See Comments)     Unknown        PFSH:     The following portions of the patient's history were reviewed and updated as appropriate: Allergies / Current Medications / Past Medical History / Surgical History / Social History / Family History    PROBLEM LIST   Patient Active Problem List   Diagnosis   • Depression   • Hypertension   • Hypokalemia   • Stress at home   • Generalized anxiety disorder   • HSV-1 infection   • Hyperlipidemia   • Acne  vulgaris       PAST MEDICAL HISTORY  Past Medical History:   Diagnosis Date   • Hypertension        SURGICAL HISTORY  History reviewed. No pertinent surgical history.    SOCIAL HISTORY  Social History     Socioeconomic History   • Marital status: Single     Spouse name: Not on file   • Number of children: Not on file   • Years of education: Not on file   • Highest education level: Not on file   Tobacco Use   • Smoking status: Never Smoker   • Smokeless tobacco: Never Used   Substance and Sexual Activity   • Alcohol use: No     Frequency: Never   • Drug use: No       FAMILY HISTORY  Family History   Problem Relation Age of Onset   • Hyperlipidemia Mother    • Seizures Mother    • Anxiety disorder Mother    • Depression Mother    • Heart disease Father    • Diabetes Father    • Peripheral vascular disease Father    • Hypertension Father    • Hyperlipidemia Father    • Colon polyps Father    • Anxiety disorder Sister    • Depression Sister    • No Known Problems Brother          **Dragon Disclaimer:   Much of this encounter note is an electronic transcription/translation of spoken language to printed text. The electronic translation of spoken language may permit erroneous, or at times, nonsensical words or phrases to be inadvertently transcribed. Although I have reviewed the note for such errors, some may still exist.

## 2020-10-19 LAB
C TRACH RRNA SPEC QL NAA+PROBE: NEGATIVE
HCV AB S/CO SERPL IA: <0.1 S/CO RATIO (ref 0–0.9)
HIV 1+2 AB+HIV1 P24 AG SERPL QL IA: NON REACTIVE
N GONORRHOEA RRNA SPEC QL NAA+PROBE: NEGATIVE
RPR SER QL: NORMAL
T VAGINALIS DNA SPEC QL NAA+PROBE: NEGATIVE

## 2020-11-16 ENCOUNTER — RESULTS ENCOUNTER (OUTPATIENT)
Dept: INTERNAL MEDICINE | Age: 40
End: 2020-11-16

## 2020-11-16 DIAGNOSIS — Z71.1 CONCERN ABOUT STD IN FEMALE WITHOUT DIAGNOSIS: ICD-10-CM

## 2020-11-18 RX ORDER — LISINOPRIL 10 MG/1
10 TABLET ORAL DAILY
Qty: 90 TABLET | Refills: 1 | Status: SHIPPED | OUTPATIENT
Start: 2020-11-18 | End: 2021-06-04 | Stop reason: SDUPTHER

## 2020-11-23 DIAGNOSIS — Z71.1 CONCERN ABOUT STD IN FEMALE WITHOUT DIAGNOSIS: Primary | ICD-10-CM

## 2020-11-24 LAB
HCV AB S/CO SERPL IA: <0.1 S/CO RATIO (ref 0–0.9)
HIV 1+2 AB+HIV1 P24 AG SERPL QL IA: NON REACTIVE

## 2020-12-22 ENCOUNTER — OFFICE VISIT (OUTPATIENT)
Dept: INTERNAL MEDICINE | Age: 40
End: 2020-12-22

## 2020-12-22 VITALS
TEMPERATURE: 98.6 F | OXYGEN SATURATION: 98 % | BODY MASS INDEX: 29.39 KG/M2 | SYSTOLIC BLOOD PRESSURE: 124 MMHG | WEIGHT: 176.4 LBS | DIASTOLIC BLOOD PRESSURE: 70 MMHG | HEIGHT: 65 IN | RESPIRATION RATE: 20 BRPM

## 2020-12-22 DIAGNOSIS — F41.1 GENERALIZED ANXIETY DISORDER: ICD-10-CM

## 2020-12-22 DIAGNOSIS — E78.5 HYPERLIPIDEMIA, UNSPECIFIED HYPERLIPIDEMIA TYPE: ICD-10-CM

## 2020-12-22 DIAGNOSIS — I10 ESSENTIAL HYPERTENSION: Primary | ICD-10-CM

## 2020-12-22 PROCEDURE — 99214 OFFICE O/P EST MOD 30 MIN: CPT | Performed by: NURSE PRACTITIONER

## 2020-12-22 NOTE — PROGRESS NOTES
[facility] [place] [facility]  Anjali VEGA Simpson / 40 y.o. / female  12/22/2020      ASSESSMENT & PLAN:    Problem List Items Addressed This Visit        Cardiovascular and Mediastinum    Hypertension - Primary    Relevant Medications    lisinopril (PRINIVIL,ZESTRIL) 10 MG tablet    Other Relevant Orders    Comprehensive Metabolic Panel    Hyperlipidemia    Relevant Orders    Lipid Panel With / Chol / HDL Ratio       Other    Generalized anxiety disorder    Relevant Medications    sertraline (ZOLOFT) 50 MG tablet        Orders Placed This Encounter   Procedures   • Comprehensive Metabolic Panel   • Lipid Panel With / Chol / HDL Ratio     New Medications Ordered This Visit   Medications   • sertraline (ZOLOFT) 50 MG tablet     Sig: Take 2 tablets by mouth Daily.     Dispense:  180 tablet     Refill:  1       Summary/Discussion:    1. Essential hypertension  Blood pressure stable on current therapy, continue same.  Check labs today and adjust dosage of medication as necessary.  Follow-up 6 months.    Continue lifestyle modifications for high blood pressure, high cholesterol, and increased weight. Decrease/eliminate soda, caffeine, alcohol and overall caloric intake. Reduce carbohydrates and sweets in diet.  Continue to improve dietary habits with lean proteins, fresh vegetables, fruits, and nuts. Improve aerobic exercise: walking/biking/swimming daily as tolerated, recommend 30 minutes/day at least 5 days/week.       - Comprehensive Metabolic Panel    2. Hyperlipidemia, unspecified hyperlipidemia type  Check fasting lipid panel today.  Continue to follow and improve on low-fat, low carbohydrate diet.     - Lipid Panel With / Chol / HDL Ratio    3. Generalized anxiety disorder  Continue current dosage, re-evaluate need for dosage adjustment next office visit.     - sertraline (ZOLOFT) 50 MG tablet; Take 2 tablets by mouth Daily.  Dispense: 180 tablet; Refill: 1          Return in about 6 months (around  "6/22/2021) for Annual physical, Next scheduled follow up with fasting labs 1 week prior .  ____________________________________________________________________    MEDICATIONS  Current Outpatient Medications   Medication Sig Dispense Refill   • adapalene (DIFFERIN) 0.1 % gel Apply  topically to the appropriate area as directed Every Night. 45 g 2   • B Complex-Biotin-FA (B COMPLEX 100 TR) tablet controlled-release B COMPLEX 100 TR CR-TABS     • ibuprofen (EQ IBUPROFEN) 200 MG tablet EQ IBUPROFEN 200 MG TABS     • lisinopril (PRINIVIL,ZESTRIL) 10 MG tablet Take 1 tablet by mouth Daily. 90 tablet 1   • magnesium oxide (MAG-OX) 400 MG tablet Take 2 tablets by mouth Daily.  0   • Omega-3 Fatty Acids (FISH OIL) 1000 MG capsule capsule      • sertraline (ZOLOFT) 50 MG tablet Take 2 tablets by mouth Daily. 180 tablet 1   • valACYclovir (Valtrex) 500 MG tablet Take 1 tablet by mouth 2 (Two) Times a Day As Needed (OUTBREAK). 30 tablet 5   • vitamin E (vitamin E) 400 UNIT capsule        No current facility-administered medications for this visit.        VITALS    Visit Vitals  /70   Temp 98.6 °F (37 °C) (Oral)   Resp 20   Ht 165.1 cm (65\")   Wt 80 kg (176 lb 6.4 oz)   LMP 12/21/2020   SpO2 98%   BMI 29.35 kg/m²       BP Readings from Last 3 Encounters:   12/22/20 124/70   11/23/20 118/80   10/16/20 120/80     Wt Readings from Last 3 Encounters:   12/22/20 80 kg (176 lb 6.4 oz)   11/23/20 80.3 kg (177 lb)   10/16/20 79.4 kg (175 lb)      Body mass index is 29.35 kg/m².    CC:  Main reason(s) for today's visit: Follow-up for hypertension and hyperlipidemia     HPI:   Chronic essential hypertension:  Since prior visit: compliant with medication(s) and denies significant problems with medication(s). Currently taking lisinopril (Prinivil). She is not exercising, but is adherent to low sodium diet.  She denies symptoms of chest pain/pressure, dyspnea, swelling, vision impairment. CVD risk factors include: dyslipidemia, HTN,  " and sedentary lifestyle. Use of agents associated with HTN: no tobacco use . Most recent in-office blood pressure readings:   BP Readings from Last 3 Encounters:   12/22/20 124/70   11/23/20 118/80   10/16/20 120/80       Chronic hyperlipidemia:  Current therapy include no prior medication.    Most recent labs:   Lab Results   Component Value Date     (H) 06/19/2020     (H) 02/13/2020     (H) 10/01/2019    HDL 45 06/19/2020    HDL 48 02/13/2020    HDL 46 10/01/2019    TRIG 82 06/19/2020    CHOLHDLRATIO 4.47 06/19/2020    CHOLHDLRATIO 4.3 02/13/2020    CHOLHDLRATIO 4.02 10/01/2019        Patient reports father just passed away on 11/28 in hospice care. She recently increased her sertraline dosage to 100mg daily and is tolerating without SE.     Patient Care Team:  Anjali Valera APRN as PCP - General (Internal Medicine)  Madhu, René Whatley MD as Consulting Physician (Obstetrics and Gynecology)  ____________________________________________________________________      REVIEW OF SYSTEMS    Review of Systems   Constitutional: Negative for activity change, appetite change and unexpected weight change.   HENT: Negative for tinnitus.    Eyes: Negative for visual disturbance.   Respiratory: Negative for cough, chest tightness and shortness of breath.    Cardiovascular: Negative for chest pain, palpitations and leg swelling.   Neurological: Negative for dizziness, light-headedness and headaches.         PHYSICAL EXAMINATION    Physical Exam  Vitals signs and nursing note reviewed.   Constitutional:       General: She is not in acute distress.     Appearance: She is well-developed. She is not ill-appearing.   Cardiovascular:      Rate and Rhythm: Normal rate and regular rhythm.      Heart sounds: Normal heart sounds, S1 normal and S2 normal. No murmur.   Pulmonary:      Effort: Pulmonary effort is normal.      Breath sounds: Normal breath sounds. No decreased breath sounds, wheezing, rhonchi or  rales.   Skin:     General: Skin is warm and dry.   Neurological:      Mental Status: She is alert and oriented to person, place, and time.   Psychiatric:         Speech: Speech normal.         Behavior: Behavior normal. Behavior is cooperative.         Thought Content: Thought content normal.         Judgment: Judgment normal.         REVIEWED DATA:    Labs:   Lab Results   Component Value Date     02/13/2020    K 4.2 02/13/2020    AST 20 02/13/2020    ALT 23 02/13/2020    BUN 14 02/13/2020    CREATININE 0.74 02/13/2020    CREATININE 0.66 10/01/2019    CREATININE 0.70 09/14/2019    EGFRIFNONA 101 02/13/2020    EGFRIFAFRI 116 02/13/2020       Lab Results   Component Value Date    GLUCOSE 104 (H) 10/01/2019    GLUCOSE 138 (H) 09/14/2019    GLUCOSE 100 (H) 05/31/2017       Lab Results   Component Value Date     (H) 06/19/2020     (H) 02/13/2020     (H) 10/01/2019    HDL 45 06/19/2020    HDL 48 02/13/2020    HDL 46 10/01/2019    TRIG 82 06/19/2020    TRIG 103 02/13/2020    TRIG 73 10/01/2019    CHOLHDLRATIO 4.47 06/19/2020    CHOLHDLRATIO 4.3 02/13/2020    CHOLHDLRATIO 4.02 10/01/2019       Lab Results   Component Value Date    TSH 0.928 02/13/2020          Lab Results   Component Value Date    WBC 7.4 02/13/2020    HGB 12.8 02/13/2020    HGB 12.2 10/01/2019    HGB 14.4 05/31/2017     02/13/2020       Lab Results   Component Value Date    PROTEIN CANCELED 02/13/2020    GLUCOSEU CANCELED 02/13/2020       Imaging:        Medical Tests:        Summary of old records / correspondence / consultant report:        Request outside records:        ALLERGIES  Allergies   Allergen Reactions   • Penicillins Unknown (See Comments)     Unknown        PFSH:     The following portions of the patient's history were reviewed and updated as appropriate: Allergies / Current Medications / Past Medical History / Surgical History / Social History / Family History    PROBLEM LIST   Patient Active Problem List    Diagnosis   • Depression   • Hypertension   • Hypokalemia   • Stress at home   • Generalized anxiety disorder   • HSV-1 infection   • Hyperlipidemia   • Acne vulgaris       PAST MEDICAL HISTORY  Past Medical History:   Diagnosis Date   • Hypertension        SURGICAL HISTORY  History reviewed. No pertinent surgical history.    SOCIAL HISTORY  Social History     Socioeconomic History   • Marital status: Single     Spouse name: Not on file   • Number of children: Not on file   • Years of education: Not on file   • Highest education level: Not on file   Tobacco Use   • Smoking status: Never Smoker   • Smokeless tobacco: Never Used   Substance and Sexual Activity   • Alcohol use: Yes     Frequency: Never     Comment: social   • Drug use: No   • Sexual activity: Defer       FAMILY HISTORY  Family History   Problem Relation Age of Onset   • Hyperlipidemia Mother    • Seizures Mother    • Anxiety disorder Mother    • Depression Mother    • Heart disease Father    • Diabetes Father    • Peripheral vascular disease Father    • Hypertension Father    • Hyperlipidemia Father    • Colon polyps Father    • Other Father    • Anxiety disorder Sister    • Depression Sister    • No Known Problems Brother

## 2020-12-23 ENCOUNTER — IMMUNIZATION (OUTPATIENT)
Dept: VACCINE CLINIC | Facility: HOSPITAL | Age: 40
End: 2020-12-23

## 2020-12-23 PROCEDURE — 91300 HC SARSCOV02 VAC 30MCG/0.3ML IM: CPT | Performed by: INTERNAL MEDICINE

## 2020-12-23 PROCEDURE — 0001A: CPT | Performed by: INTERNAL MEDICINE

## 2020-12-29 ENCOUNTER — TELEPHONE (OUTPATIENT)
Dept: INTERNAL MEDICINE | Age: 40
End: 2020-12-29

## 2020-12-31 ENCOUNTER — LAB (OUTPATIENT)
Dept: LAB | Facility: HOSPITAL | Age: 40
End: 2020-12-31

## 2020-12-31 LAB
ALBUMIN SERPL-MCNC: 4.1 G/DL (ref 3.5–5.2)
ALBUMIN/GLOB SERPL: 1.2 G/DL
ALP SERPL-CCNC: 58 U/L (ref 39–117)
ALT SERPL W P-5'-P-CCNC: 17 U/L (ref 1–33)
ANION GAP SERPL CALCULATED.3IONS-SCNC: 9 MMOL/L (ref 5–15)
AST SERPL-CCNC: 18 U/L (ref 1–32)
BACTERIA UR QL AUTO: ABNORMAL /HPF
BILIRUB SERPL-MCNC: 0.2 MG/DL (ref 0–1.2)
BILIRUB UR QL STRIP: NEGATIVE
BUN SERPL-MCNC: 15 MG/DL (ref 6–20)
BUN/CREAT SERPL: 21.4 (ref 7–25)
CALCIUM SPEC-SCNC: 8.7 MG/DL (ref 8.6–10.5)
CHLORIDE SERPL-SCNC: 103 MMOL/L (ref 98–107)
CHOLEST SERPL-MCNC: 199 MG/DL (ref 0–200)
CLARITY UR: CLEAR
CO2 SERPL-SCNC: 24 MMOL/L (ref 22–29)
COLOR UR: YELLOW
CREAT SERPL-MCNC: 0.7 MG/DL (ref 0.57–1)
GFR SERPL CREATININE-BSD FRML MDRD: 92 ML/MIN/1.73
GLOBULIN UR ELPH-MCNC: 3.4 GM/DL
GLUCOSE SERPL-MCNC: 101 MG/DL (ref 65–99)
GLUCOSE UR STRIP-MCNC: NEGATIVE MG/DL
HDLC SERPL QL: 4.42
HDLC SERPL-MCNC: 45 MG/DL (ref 40–60)
HGB UR QL STRIP.AUTO: ABNORMAL
HYALINE CASTS UR QL AUTO: ABNORMAL /LPF
KETONES UR QL STRIP: NEGATIVE
LDLC SERPL CALC-MCNC: 140 MG/DL (ref 0–100)
LEUKOCYTE ESTERASE UR QL STRIP.AUTO: NEGATIVE
NITRITE UR QL STRIP: NEGATIVE
PH UR STRIP.AUTO: 6 [PH] (ref 5–8)
POTASSIUM SERPL-SCNC: 4 MMOL/L (ref 3.5–5.2)
PROT SERPL-MCNC: 7.5 G/DL (ref 6–8.5)
PROT UR QL STRIP: NEGATIVE
RBC # UR: ABNORMAL /HPF
REF LAB TEST METHOD: ABNORMAL
SODIUM SERPL-SCNC: 136 MMOL/L (ref 136–145)
SP GR UR STRIP: 1.02 (ref 1–1.03)
SQUAMOUS #/AREA URNS HPF: ABNORMAL /HPF
TRIGL SERPL-MCNC: 75 MG/DL (ref 0–150)
UROBILINOGEN UR QL STRIP: ABNORMAL
VLDLC SERPL-MCNC: 14 MG/DL (ref 5–40)
WBC UR QL AUTO: ABNORMAL /HPF

## 2020-12-31 PROCEDURE — 80053 COMPREHEN METABOLIC PANEL: CPT | Performed by: NURSE PRACTITIONER

## 2020-12-31 PROCEDURE — 81001 URINALYSIS AUTO W/SCOPE: CPT | Performed by: NURSE PRACTITIONER

## 2020-12-31 PROCEDURE — 80061 LIPID PANEL: CPT | Performed by: NURSE PRACTITIONER

## 2020-12-31 PROCEDURE — 36415 COLL VENOUS BLD VENIPUNCTURE: CPT | Performed by: NURSE PRACTITIONER

## 2021-01-12 ENCOUNTER — APPOINTMENT (OUTPATIENT)
Dept: VACCINE CLINIC | Facility: HOSPITAL | Age: 41
End: 2021-01-12

## 2021-01-14 ENCOUNTER — IMMUNIZATION (OUTPATIENT)
Dept: VACCINE CLINIC | Facility: HOSPITAL | Age: 41
End: 2021-01-14

## 2021-01-14 PROCEDURE — 0002A: CPT | Performed by: INTERNAL MEDICINE

## 2021-01-14 PROCEDURE — 0001A: CPT | Performed by: INTERNAL MEDICINE

## 2021-01-14 PROCEDURE — 91300 HC SARSCOV02 VAC 30MCG/0.3ML IM: CPT | Performed by: INTERNAL MEDICINE

## 2021-03-17 DIAGNOSIS — F41.1 GENERALIZED ANXIETY DISORDER: ICD-10-CM

## 2021-03-17 DIAGNOSIS — Z11.1 VISIT FOR TB SKIN TEST: Primary | ICD-10-CM

## 2021-03-17 NOTE — TELEPHONE ENCOUNTER
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 Her paperwork states she needs one of the three: tspot, BAMT, or QFT- TB-GIT.     Are you ok with patient having it here, and you ordering it?

## 2021-03-18 NOTE — TELEPHONE ENCOUNTER
I just placed the lab order for this at [facility].   No one ever routed it to me yesterday.   Please let patient know.

## 2021-03-18 NOTE — TELEPHONE ENCOUNTER
I refilled this yesterday for 100mg daily.   Please verify with patient that she got the right RX.

## 2021-03-19 ENCOUNTER — LAB (OUTPATIENT)
Dept: LAB | Facility: HOSPITAL | Age: 41
End: 2021-03-19

## 2021-03-19 DIAGNOSIS — Z11.1 VISIT FOR TB SKIN TEST: ICD-10-CM

## 2021-03-19 DIAGNOSIS — F41.1 GENERALIZED ANXIETY DISORDER: ICD-10-CM

## 2021-03-19 PROCEDURE — 86481 TB AG RESPONSE T-CELL SUSP: CPT

## 2021-03-19 PROCEDURE — 36415 COLL VENOUS BLD VENIPUNCTURE: CPT

## 2021-03-19 NOTE — TELEPHONE ENCOUNTER
Patient notified of medication refill, as well as the order for the tspot has been placed and for her to have it at [facility].

## 2021-03-20 LAB
TSPOT INTERPRETATION: NEGATIVE
TSPOT NIL CONTROL INTERPRETATION: NORMAL
TSPOT PANEL A: 0
TSPOT PANEL B: 0
TSPOT POS CONTROL INTERPRETATION: NORMAL

## 2021-06-04 RX ORDER — LISINOPRIL 10 MG/1
10 TABLET ORAL DAILY
Qty: 90 TABLET | Refills: 1 | Status: SHIPPED | OUTPATIENT
Start: 2021-06-04 | End: 2021-12-28 | Stop reason: SDUPTHER

## 2021-06-24 DIAGNOSIS — Z00.00 ROUTINE ADULT HEALTH MAINTENANCE: Primary | ICD-10-CM

## 2021-06-30 ENCOUNTER — TELEPHONE (OUTPATIENT)
Dept: INTERNAL MEDICINE | Age: 41
End: 2021-06-30

## 2021-10-01 DIAGNOSIS — F41.1 GENERALIZED ANXIETY DISORDER: ICD-10-CM

## 2021-12-28 DIAGNOSIS — F41.1 GENERALIZED ANXIETY DISORDER: ICD-10-CM

## 2021-12-29 RX ORDER — LISINOPRIL 10 MG/1
10 TABLET ORAL DAILY
Qty: 90 TABLET | Refills: 1 | Status: SHIPPED | OUTPATIENT
Start: 2021-12-29

## 2022-01-06 ENCOUNTER — TELEPHONE (OUTPATIENT)
Dept: INTERNAL MEDICINE | Age: 42
End: 2022-01-06

## 2022-01-06 NOTE — TELEPHONE ENCOUNTER
LVM PT NEEDS TO RESCHEDULE OR CANCEL APPT WITH [facility] DUE TO HER LEAVING THE OFFICE.  OK FOR HUB TO SCHEDULE

## 2022-01-13 ENCOUNTER — TELEPHONE (OUTPATIENT)
Dept: INTERNAL MEDICINE | Age: 42
End: 2022-01-13

## 2022-01-13 NOTE — TELEPHONE ENCOUNTER
LVM FOR PATIENT TO RESCHEDULE [facility] HURST APPT DUE TO PROVIDER LEAVING THE OFFICE.  OK FOR HUB TO SCHEDULE

## 2022-01-19 ENCOUNTER — LAB (OUTPATIENT)
Dept: LAB | Facility: HOSPITAL | Age: 42
End: 2022-01-19

## 2022-01-19 DIAGNOSIS — Z20.822 SUSPECTED COVID-19 VIRUS INFECTION: ICD-10-CM

## 2022-01-19 PROCEDURE — U0004 COV-19 TEST NON-CDC HGH THRU: HCPCS

## 2022-01-19 PROCEDURE — C9803 HOPD COVID-19 SPEC COLLECT: HCPCS

## 2022-01-20 ENCOUNTER — TELEPHONE (OUTPATIENT)
Dept: INTERNAL MEDICINE | Age: 42
End: 2022-01-20

## 2022-01-20 LAB — SARS-COV-2 ORF1AB RESP QL NAA+PROBE: NOT DETECTED

## 2022-01-21 ENCOUNTER — TELEPHONE (OUTPATIENT)
Dept: INTERNAL MEDICINE | Age: 42
End: 2022-01-21

## 2022-03-21 DIAGNOSIS — F41.1 GENERALIZED ANXIETY DISORDER: ICD-10-CM

## 2022-03-21 RX ORDER — SERTRALINE HYDROCHLORIDE 100 MG/1
100 TABLET, FILM COATED ORAL DAILY
Qty: 30 TABLET | Refills: 0 | Status: SHIPPED | OUTPATIENT
Start: 2022-03-21 | End: 2023-01-26 | Stop reason: SDUPTHER

## 2022-04-14 DIAGNOSIS — F41.1 GENERALIZED ANXIETY DISORDER: ICD-10-CM

## 2022-04-14 RX ORDER — SERTRALINE HYDROCHLORIDE 100 MG/1
100 TABLET, FILM COATED ORAL DAILY
Qty: 30 TABLET | Refills: 0 | Status: CANCELLED | OUTPATIENT
Start: 2022-04-14

## 2022-04-14 RX ORDER — LISINOPRIL 10 MG/1
10 TABLET ORAL DAILY
Qty: 90 TABLET | Refills: 1 | Status: CANCELLED | OUTPATIENT
Start: 2022-04-14

## 2022-04-15 DIAGNOSIS — F41.1 GENERALIZED ANXIETY DISORDER: ICD-10-CM

## 2022-04-15 RX ORDER — SERTRALINE HYDROCHLORIDE 100 MG/1
100 TABLET, FILM COATED ORAL DAILY
Qty: 30 TABLET | Refills: 0 | OUTPATIENT
Start: 2022-04-15

## 2022-04-15 RX ORDER — LISINOPRIL 10 MG/1
10 TABLET ORAL DAILY
Qty: 30 TABLET | Refills: 0 | OUTPATIENT
Start: 2022-04-15

## 2023-01-26 DIAGNOSIS — F41.1 GENERALIZED ANXIETY DISORDER: ICD-10-CM

## 2023-01-26 RX ORDER — SERTRALINE HYDROCHLORIDE 100 MG/1
100 TABLET, FILM COATED ORAL DAILY
Qty: 30 TABLET | Refills: 0 | Status: SHIPPED | OUTPATIENT
Start: 2023-01-26

## 2023-01-26 NOTE — TELEPHONE ENCOUNTER
Last Ov- 12/22/2020  Nex OV- Visit date not found    New pt appt with Renita on 4/25/2023  Last refills for this office have been denied 4/15/2022

## 2023-02-01 RX ORDER — LISINOPRIL 10 MG/1
10 TABLET ORAL DAILY
Qty: 90 TABLET | Refills: 1 | Status: CANCELLED | OUTPATIENT
Start: 2023-02-01

## 2023-02-03 RX ORDER — LISINOPRIL 10 MG/1
10 TABLET ORAL DAILY
Qty: 90 TABLET | Refills: 1 | OUTPATIENT
Start: 2023-02-03
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
## 2023-04-25 ENCOUNTER — OFFICE VISIT (OUTPATIENT)
Dept: FAMILY MEDICINE CLINIC | Facility: CLINIC | Age: 43
End: 2023-04-25
Payer: MEDICAID

## 2023-04-25 ENCOUNTER — LAB (OUTPATIENT)
Dept: FAMILY MEDICINE CLINIC | Facility: CLINIC | Age: 43
End: 2023-04-25
Payer: MEDICAID

## 2023-04-25 VITALS
OXYGEN SATURATION: 100 % | TEMPERATURE: 98.4 F | WEIGHT: 184 LBS | HEIGHT: 65 IN | HEART RATE: 71 BPM | DIASTOLIC BLOOD PRESSURE: 84 MMHG | SYSTOLIC BLOOD PRESSURE: 134 MMHG | BODY MASS INDEX: 30.66 KG/M2

## 2023-04-25 DIAGNOSIS — Z00.00 PREVENTATIVE HEALTH CARE: ICD-10-CM

## 2023-04-25 DIAGNOSIS — I10 HYPERTENSION, UNSPECIFIED TYPE: ICD-10-CM

## 2023-04-25 DIAGNOSIS — B00.9 HSV-1 INFECTION: ICD-10-CM

## 2023-04-25 DIAGNOSIS — I10 HYPERTENSION, UNSPECIFIED TYPE: Primary | ICD-10-CM

## 2023-04-25 DIAGNOSIS — F90.2 ATTENTION DEFICIT HYPERACTIVITY DISORDER (ADHD), COMBINED TYPE: ICD-10-CM

## 2023-04-25 DIAGNOSIS — Z12.31 BREAST CANCER SCREENING BY MAMMOGRAM: ICD-10-CM

## 2023-04-25 DIAGNOSIS — F41.1 GENERALIZED ANXIETY DISORDER: ICD-10-CM

## 2023-04-25 LAB
ALBUMIN SERPL-MCNC: 4.6 G/DL (ref 3.5–5.2)
ALBUMIN/GLOB SERPL: 1.4 G/DL
ALP SERPL-CCNC: 69 U/L (ref 39–117)
ALT SERPL W P-5'-P-CCNC: 41 U/L (ref 1–33)
ANION GAP SERPL CALCULATED.3IONS-SCNC: 9 MMOL/L (ref 5–15)
AST SERPL-CCNC: 21 U/L (ref 1–32)
BILIRUB SERPL-MCNC: 0.2 MG/DL (ref 0–1.2)
BUN SERPL-MCNC: 12 MG/DL (ref 6–20)
BUN/CREAT SERPL: 13 (ref 7–25)
CALCIUM SPEC-SCNC: 10.1 MG/DL (ref 8.6–10.5)
CHLORIDE SERPL-SCNC: 101 MMOL/L (ref 98–107)
CHOLEST SERPL-MCNC: 222 MG/DL (ref 0–200)
CO2 SERPL-SCNC: 28 MMOL/L (ref 22–29)
CREAT SERPL-MCNC: 0.92 MG/DL (ref 0.57–1)
EGFRCR SERPLBLD CKD-EPI 2021: 78.9 ML/MIN/1.73
GLOBULIN UR ELPH-MCNC: 3.4 GM/DL
GLUCOSE SERPL-MCNC: 103 MG/DL (ref 65–99)
HDLC SERPL-MCNC: 35 MG/DL (ref 40–60)
LDLC SERPL CALC-MCNC: 154 MG/DL (ref 0–100)
LDLC/HDLC SERPL: 4.31 {RATIO}
POTASSIUM SERPL-SCNC: 3.9 MMOL/L (ref 3.5–5.2)
PROT SERPL-MCNC: 8 G/DL (ref 6–8.5)
SODIUM SERPL-SCNC: 138 MMOL/L (ref 136–145)
TRIGL SERPL-MCNC: 180 MG/DL (ref 0–150)
VLDLC SERPL-MCNC: 33 MG/DL (ref 5–40)

## 2023-04-25 PROCEDURE — 80053 COMPREHEN METABOLIC PANEL: CPT | Performed by: NURSE PRACTITIONER

## 2023-04-25 PROCEDURE — 36415 COLL VENOUS BLD VENIPUNCTURE: CPT

## 2023-04-25 PROCEDURE — 80061 LIPID PANEL: CPT | Performed by: NURSE PRACTITIONER

## 2023-04-25 RX ORDER — BUPROPION HYDROCHLORIDE 150 MG/1
1 TABLET ORAL DAILY
COMMUNITY
Start: 2023-04-07 | End: 2023-04-25

## 2023-04-25 RX ORDER — SERTRALINE HYDROCHLORIDE 100 MG/1
100 TABLET, FILM COATED ORAL DAILY
Qty: 30 TABLET | Refills: 0 | Status: SHIPPED | OUTPATIENT
Start: 2023-04-25

## 2023-04-25 RX ORDER — BACLOFEN 20 MG
TABLET ORAL AS NEEDED
COMMUNITY

## 2023-04-25 RX ORDER — BUPROPION HYDROCHLORIDE 300 MG/1
300 TABLET ORAL DAILY
Qty: 90 TABLET | Refills: 1 | Status: SHIPPED | OUTPATIENT
Start: 2023-04-25

## 2023-04-25 RX ORDER — LISINOPRIL 10 MG/1
10 TABLET ORAL DAILY
Qty: 90 TABLET | Refills: 1 | Status: SHIPPED | OUTPATIENT
Start: 2023-04-25

## 2023-04-25 RX ORDER — CLINDAMYCIN PHOSPHATE 10 UG/ML
LOTION TOPICAL DAILY
COMMUNITY
Start: 2023-04-19

## 2023-05-04 RX ORDER — DOXYCYCLINE 100 MG/1
100 CAPSULE ORAL 2 TIMES DAILY
Qty: 20 CAPSULE | Refills: 0 | Status: SHIPPED | OUTPATIENT
Start: 2023-05-04 | End: 2023-05-14

## 2023-05-29 DIAGNOSIS — F90.2 ATTENTION DEFICIT HYPERACTIVITY DISORDER (ADHD), COMBINED TYPE: ICD-10-CM

## 2023-05-29 DIAGNOSIS — I10 HYPERTENSION, UNSPECIFIED TYPE: ICD-10-CM

## 2023-05-29 DIAGNOSIS — F41.1 GENERALIZED ANXIETY DISORDER: ICD-10-CM

## 2023-05-30 RX ORDER — SERTRALINE HYDROCHLORIDE 100 MG/1
100 TABLET, FILM COATED ORAL DAILY
Qty: 30 TABLET | Refills: 0 | Status: SHIPPED | OUTPATIENT
Start: 2023-05-30

## 2023-05-30 RX ORDER — LISINOPRIL 10 MG/1
10 TABLET ORAL DAILY
Qty: 90 TABLET | Refills: 1 | Status: SHIPPED | OUTPATIENT
Start: 2023-05-30

## 2023-05-30 RX ORDER — BUPROPION HYDROCHLORIDE 300 MG/1
300 TABLET ORAL DAILY
Qty: 90 TABLET | Refills: 1 | Status: SHIPPED | OUTPATIENT
Start: 2023-05-30

## 2023-08-24 DIAGNOSIS — F41.1 GENERALIZED ANXIETY DISORDER: ICD-10-CM

## 2023-08-24 RX ORDER — SERTRALINE HYDROCHLORIDE 100 MG/1
TABLET, FILM COATED ORAL
Qty: 30 TABLET | Refills: 0 | Status: SHIPPED | OUTPATIENT
Start: 2023-08-24

## 2023-11-09 DIAGNOSIS — F41.1 GENERALIZED ANXIETY DISORDER: ICD-10-CM

## 2023-11-10 RX ORDER — SERTRALINE HYDROCHLORIDE 100 MG/1
100 TABLET, FILM COATED ORAL DAILY
Qty: 30 TABLET | Refills: 0 | Status: SHIPPED | OUTPATIENT
Start: 2023-11-10

## 2023-12-14 DIAGNOSIS — F41.1 GENERALIZED ANXIETY DISORDER: ICD-10-CM

## 2023-12-14 RX ORDER — SERTRALINE HYDROCHLORIDE 100 MG/1
100 TABLET, FILM COATED ORAL DAILY
Qty: 30 TABLET | Refills: 0 | Status: SHIPPED | OUTPATIENT
Start: 2023-12-14

## 2024-03-06 DIAGNOSIS — I10 HYPERTENSION, UNSPECIFIED TYPE: ICD-10-CM

## 2024-03-06 DIAGNOSIS — F90.2 ATTENTION DEFICIT HYPERACTIVITY DISORDER (ADHD), COMBINED TYPE: ICD-10-CM

## 2024-03-06 RX ORDER — LISINOPRIL 10 MG/1
10 TABLET ORAL DAILY
Qty: 90 TABLET | Refills: 1 | Status: SHIPPED | OUTPATIENT
Start: 2024-03-06

## 2024-03-06 RX ORDER — BUPROPION HYDROCHLORIDE 300 MG/1
300 TABLET ORAL DAILY
Qty: 90 TABLET | Refills: 1 | Status: SHIPPED | OUTPATIENT
Start: 2024-03-06